# Patient Record
Sex: MALE | Race: WHITE | Employment: STUDENT | URBAN - NONMETROPOLITAN AREA
[De-identification: names, ages, dates, MRNs, and addresses within clinical notes are randomized per-mention and may not be internally consistent; named-entity substitution may affect disease eponyms.]

---
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
## 2025-01-07 ENCOUNTER — HOSPITAL ENCOUNTER (INPATIENT)
Age: 25
LOS: 10 days | Discharge: HOME | End: 2025-01-17
Attending: PHYSICAL MEDICINE & REHABILITATION | Admitting: PHYSICAL MEDICINE & REHABILITATION
Payer: COMMERCIAL

## 2025-01-07 DIAGNOSIS — S72.301A CLOSED FRACTURE OF SHAFT OF RIGHT FEMUR, INITIAL ENCOUNTER (HCC): Primary | ICD-10-CM

## 2025-01-07 DIAGNOSIS — S82.002A CLOSED NONDISPLACED FRACTURE OF LEFT PATELLA, UNSPECIFIED FRACTURE MORPHOLOGY, INITIAL ENCOUNTER: ICD-10-CM

## 2025-01-07 DIAGNOSIS — S52.571A: ICD-10-CM

## 2025-01-07 PROBLEM — S54.12XA: Status: ACTIVE | Noted: 2025-01-07

## 2025-01-07 PROBLEM — T07.XXXA MULTIPLE FRACTURE: Status: ACTIVE | Noted: 2025-01-07

## 2025-01-07 PROBLEM — M24.532: Status: ACTIVE | Noted: 2025-01-07

## 2025-01-07 PROBLEM — S54.02XA INJURY OF LEFT ULNAR NERVE: Status: ACTIVE | Noted: 2025-01-07

## 2025-01-07 PROCEDURE — 99222 1ST HOSP IP/OBS MODERATE 55: CPT | Performed by: PHYSICAL MEDICINE & REHABILITATION

## 2025-01-07 PROCEDURE — 87641 MR-STAPH DNA AMP PROBE: CPT

## 2025-01-07 PROCEDURE — 1180000000 HC REHAB R&B

## 2025-01-07 PROCEDURE — 6370000000 HC RX 637 (ALT 250 FOR IP): Performed by: PHYSICAL MEDICINE & REHABILITATION

## 2025-01-07 RX ORDER — ACETAMINOPHEN 325 MG/1
650 TABLET ORAL EVERY 6 HOURS
Status: DISCONTINUED | OUTPATIENT
Start: 2025-01-07 | End: 2025-01-17 | Stop reason: HOSPADM

## 2025-01-07 RX ORDER — ONDANSETRON 4 MG/1
4 TABLET, ORALLY DISINTEGRATING ORAL EVERY 8 HOURS PRN
Status: DISCONTINUED | OUTPATIENT
Start: 2025-01-07 | End: 2025-01-17 | Stop reason: HOSPADM

## 2025-01-07 RX ORDER — POTASSIUM CHLORIDE 1500 MG/1
40 TABLET, EXTENDED RELEASE ORAL PRN
Status: DISCONTINUED | OUTPATIENT
Start: 2025-01-07 | End: 2025-01-17 | Stop reason: HOSPADM

## 2025-01-07 RX ORDER — LOPERAMIDE HYDROCHLORIDE 2 MG/1
2 CAPSULE ORAL 4 TIMES DAILY PRN
Status: DISCONTINUED | OUTPATIENT
Start: 2025-01-07 | End: 2025-01-17 | Stop reason: HOSPADM

## 2025-01-07 RX ORDER — ACETAMINOPHEN 325 MG/1
650 TABLET ORAL EVERY 4 HOURS PRN
Status: DISCONTINUED | OUTPATIENT
Start: 2025-01-07 | End: 2025-01-17 | Stop reason: HOSPADM

## 2025-01-07 RX ORDER — POTASSIUM CHLORIDE 7.45 MG/ML
10 INJECTION INTRAVENOUS PRN
Status: DISCONTINUED | OUTPATIENT
Start: 2025-01-07 | End: 2025-01-17 | Stop reason: HOSPADM

## 2025-01-07 RX ORDER — ENOXAPARIN SODIUM 100 MG/ML
40 INJECTION SUBCUTANEOUS EVERY 12 HOURS
Status: DISCONTINUED | OUTPATIENT
Start: 2025-01-07 | End: 2025-01-07

## 2025-01-07 RX ORDER — MAGNESIUM SULFATE IN WATER 40 MG/ML
2000 INJECTION, SOLUTION INTRAVENOUS PRN
Status: DISCONTINUED | OUTPATIENT
Start: 2025-01-07 | End: 2025-01-17 | Stop reason: HOSPADM

## 2025-01-07 RX ORDER — OXYCODONE HYDROCHLORIDE 5 MG/1
5 TABLET ORAL EVERY 4 HOURS PRN
Status: DISCONTINUED | OUTPATIENT
Start: 2025-01-07 | End: 2025-01-08

## 2025-01-07 RX ORDER — GABAPENTIN 300 MG/1
300 CAPSULE ORAL EVERY 8 HOURS
Status: DISCONTINUED | OUTPATIENT
Start: 2025-01-07 | End: 2025-01-17 | Stop reason: HOSPADM

## 2025-01-07 RX ORDER — TRAZODONE HYDROCHLORIDE 50 MG/1
50 TABLET, FILM COATED ORAL NIGHTLY PRN
Status: DISCONTINUED | OUTPATIENT
Start: 2025-01-07 | End: 2025-01-17 | Stop reason: HOSPADM

## 2025-01-07 RX ORDER — NICOTINE 21 MG/24HR
1 PATCH, TRANSDERMAL 24 HOURS TRANSDERMAL DAILY
Status: DISCONTINUED | OUTPATIENT
Start: 2025-01-07 | End: 2025-01-17 | Stop reason: HOSPADM

## 2025-01-07 RX ORDER — POLYETHYLENE GLYCOL 3350 17 G/17G
17 POWDER, FOR SOLUTION ORAL DAILY PRN
Status: DISCONTINUED | OUTPATIENT
Start: 2025-01-07 | End: 2025-01-17 | Stop reason: HOSPADM

## 2025-01-07 RX ORDER — OXYCODONE HYDROCHLORIDE 5 MG/1
10 TABLET ORAL EVERY 4 HOURS PRN
Status: DISCONTINUED | OUTPATIENT
Start: 2025-01-07 | End: 2025-01-08

## 2025-01-07 RX ORDER — BISACODYL 10 MG
10 SUPPOSITORY, RECTAL RECTAL DAILY PRN
Status: DISCONTINUED | OUTPATIENT
Start: 2025-01-07 | End: 2025-01-17 | Stop reason: HOSPADM

## 2025-01-07 RX ORDER — GINSENG 100 MG
1 CAPSULE ORAL 2 TIMES DAILY
Status: DISCONTINUED | OUTPATIENT
Start: 2025-01-07 | End: 2025-01-17 | Stop reason: HOSPADM

## 2025-01-07 RX ORDER — AMLODIPINE BESYLATE 5 MG/1
5 TABLET ORAL DAILY
Status: DISCONTINUED | OUTPATIENT
Start: 2025-01-07 | End: 2025-01-08

## 2025-01-07 RX ORDER — METHOCARBAMOL 500 MG/1
500 TABLET, FILM COATED ORAL 3 TIMES DAILY
Status: DISCONTINUED | OUTPATIENT
Start: 2025-01-07 | End: 2025-01-17 | Stop reason: HOSPADM

## 2025-01-07 RX ORDER — ENOXAPARIN SODIUM 100 MG/ML
40 INJECTION SUBCUTANEOUS EVERY 24 HOURS
Status: DISCONTINUED | OUTPATIENT
Start: 2025-01-08 | End: 2025-01-17 | Stop reason: HOSPADM

## 2025-01-07 RX ORDER — DOCUSATE SODIUM 100 MG/1
100 CAPSULE, LIQUID FILLED ORAL 2 TIMES DAILY
Status: DISCONTINUED | OUTPATIENT
Start: 2025-01-07 | End: 2025-01-17 | Stop reason: HOSPADM

## 2025-01-07 RX ORDER — SENNOSIDES A AND B 8.6 MG/1
2 TABLET, FILM COATED ORAL NIGHTLY
Status: DISCONTINUED | OUTPATIENT
Start: 2025-01-07 | End: 2025-01-17 | Stop reason: HOSPADM

## 2025-01-07 RX ADMIN — GABAPENTIN 300 MG: 300 CAPSULE ORAL at 14:51

## 2025-01-07 RX ADMIN — ACETAMINOPHEN 650 MG: 325 TABLET ORAL at 20:40

## 2025-01-07 RX ADMIN — GABAPENTIN 300 MG: 300 CAPSULE ORAL at 22:53

## 2025-01-07 RX ADMIN — METHOCARBAMOL 500 MG: 500 TABLET ORAL at 14:51

## 2025-01-07 RX ADMIN — BACITRACIN 1 APPLICATION: 500 OINTMENT TOPICAL at 14:52

## 2025-01-07 RX ADMIN — ACETAMINOPHEN 650 MG: 325 TABLET ORAL at 14:51

## 2025-01-07 RX ADMIN — Medication 6 MG: at 20:40

## 2025-01-07 RX ADMIN — METHOCARBAMOL 500 MG: 500 TABLET ORAL at 20:40

## 2025-01-07 NOTE — PROGRESS NOTES
Admitted to the Inpatient Rehabilitation Unit via wheelchair.  Patient was then oriented to room and unit.  Education provided on the rehabilitation routine: three hours of therapy five days per week.      Explained patients right to have family, representative or physician notified of their admission.  Patient has Declined for physician to be notified.  Patient has Declined for family/representative to be notified.    Admitting medication orders compared with acute stay medications; home medication list reviewed with patient/family.  Medication issues identified No    Vaccination Status  Have you ever received a COVID-19 vaccine? No      Transportation:   Has transportation kept you from medical appointments, meetings, work, or from getting things needed for daily living? (Check all that apply)  No.      Health Literacy:   How often do you need to have someone help you when you read instructions, pamphlets, or other written material from your doctor or pharmacy?  0. - Never    Social Isolation:  How often do you feel lonely or isolated from those around you?  0. Never    Patient Mood Interview (PHQ-2 to 9) (from [employer]©)   Say to Patient: \"Over the last 2 weeks, have you been bothered by any of the following problems?\"   If symptom is present, enter yes in column 1 (Symptom Presence)  If yes in column 1, then ask the patient: “About how often have you been bothered by this?” Indicate response in column 2, Symptom Frequency.   Symptom Presence  No    Yes   9.    No response  Symptom Frequency  Never or 1 day  2-6 days (several days)  7-11 days (half or more of the days)  12-14 days (nearly every day)    Symptom Presence Symptom Frequency   Little interest or pleasure in doing things 0. No 0. Never or 1 day   Feeling down, depressed, or hopeless 0. No 0. Never or 1 day   If either A or B above has symptom frequency coded 2 or 3, CONTINUE asking questions below.      If not, END the interview  and right click on

## 2025-01-07 NOTE — PROGRESS NOTES
Orthopedic office MARCUS Ayala returned call noting that staples can be removed on 1/12/25 with sutures. Virtual appointment will be scheduled 1/13/25 at 3 pm.

## 2025-01-07 NOTE — CARE COORDINATION
Contacted [facility] Orthopedic Trauma office at [phone]. Surgeon's PA to call back regarding staple removal date. Sutures to be removed from right pointer finger on 1/12/25.

## 2025-01-07 NOTE — PLAN OF CARE
Problem: Discharge Planning  Goal: Discharge to home or other facility with appropriate resources  Outcome: Progressing  Flowsheets (Taken 1/7/2025 1428)  Discharge to home or other facility with appropriate resources: Identify barriers to discharge with patient and caregiver     Problem: Self Harm/Suicidality  Goal: Will have no self-injury during hospital stay  Description: INTERVENTIONS:  1.  Ensure constant observer at bedside with Q15M safety checks  2.  Maintain a safe environment  3.  Secure patient belongings  4.  Ensure family/visitors adhere to safety recommendations  5.  Ensure safety tray has been added to patient's diet order  6.  Every shift and PRN: Re-assess suicidal risk via Frequent Screener    Outcome: Progressing  Flowsheets (Taken 1/7/2025 1428)  Will have no self-injury during hospital stay: Maintain a safe environment     Problem: Safety - Adult  Goal: Free from fall injury  Outcome: Progressing  Flowsheets (Taken 1/7/2025 1428)  Free From Fall Injury: Instruct family/caregiver on patient safety     Problem: Pain  Goal: Verbalizes/displays adequate comfort level or baseline comfort level  Outcome: Progressing  Flowsheets (Taken 1/7/2025 1428)  Verbalizes/displays adequate comfort level or baseline comfort level: Encourage patient to monitor pain and request assistance     Problem: Skin/Tissue Integrity  Goal: Absence of new skin breakdown  Description: 1.  Monitor for areas of redness and/or skin breakdown  2.  Assess vascular access sites hourly  3.  Every 4-6 hours minimum:  Change oxygen saturation probe site  4.  Every 4-6 hours:  If on nasal continuous positive airway pressure, respiratory therapy assess nares and determine need for appliance change or resting period.  Outcome: Progressing
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
## 2025-01-08 ENCOUNTER — APPOINTMENT (OUTPATIENT)
Dept: GENERAL RADIOLOGY | Age: 25
End: 2025-01-08
Attending: PHYSICAL MEDICINE & REHABILITATION
Payer: COMMERCIAL

## 2025-01-08 LAB
ALBUMIN SERPL BCG-MCNC: 3.7 G/DL (ref 3.5–5.1)
ALP SERPL-CCNC: 92 U/L (ref 38–126)
ALT SERPL W/O P-5'-P-CCNC: 19 U/L (ref 11–66)
ANION GAP SERPL CALC-SCNC: 11 MEQ/L (ref 8–16)
AST SERPL-CCNC: 14 U/L (ref 5–40)
BASOPHILS ABSOLUTE: 0 THOU/MM3 (ref 0–0.1)
BASOPHILS NFR BLD AUTO: 0.3 %
BILIRUB SERPL-MCNC: 0.4 MG/DL (ref 0.3–1.2)
BUN SERPL-MCNC: 12 MG/DL (ref 7–22)
CALCIUM SERPL-MCNC: 9.6 MG/DL (ref 8.5–10.5)
CHLORIDE SERPL-SCNC: 102 MEQ/L (ref 98–111)
CHOLEST SERPL-MCNC: 160 MG/DL (ref 100–199)
CO2 SERPL-SCNC: 26 MEQ/L (ref 23–33)
CREAT SERPL-MCNC: 0.7 MG/DL (ref 0.4–1.2)
DEPRECATED RDW RBC AUTO: 40.1 FL (ref 35–45)
EOSINOPHIL NFR BLD AUTO: 1.3 %
EOSINOPHILS ABSOLUTE: 0.1 THOU/MM3 (ref 0–0.4)
ERYTHROCYTE [DISTWIDTH] IN BLOOD BY AUTOMATED COUNT: 13 % (ref 11.5–14.5)
GFR SERPL CREATININE-BSD FRML MDRD: > 90 ML/MIN/1.73M2
GLUCOSE SERPL-MCNC: 93 MG/DL (ref 70–108)
HCT VFR BLD AUTO: 33.3 % (ref 42–52)
HDLC SERPL-MCNC: 38 MG/DL
HGB BLD-MCNC: 10.5 GM/DL (ref 14–18)
IMM GRANULOCYTES # BLD AUTO: 0.05 THOU/MM3 (ref 0–0.07)
IMM GRANULOCYTES NFR BLD AUTO: 0.6 %
LDLC SERPL CALC-MCNC: 107 MG/DL
LYMPHOCYTES ABSOLUTE: 1.1 THOU/MM3 (ref 1–4.8)
LYMPHOCYTES NFR BLD AUTO: 13.5 %
MCH RBC QN AUTO: 26.9 PG (ref 26–33)
MCHC RBC AUTO-ENTMCNC: 31.5 GM/DL (ref 32.2–35.5)
MCV RBC AUTO: 85.2 FL (ref 80–94)
MONOCYTES ABSOLUTE: 0.8 THOU/MM3 (ref 0.4–1.3)
MONOCYTES NFR BLD AUTO: 10.3 %
MRSA DNA SPEC QL NAA+PROBE: NEGATIVE
NEUTROPHILS ABSOLUTE: 5.8 THOU/MM3 (ref 1.8–7.7)
NEUTROPHILS NFR BLD AUTO: 74 %
NRBC BLD AUTO-RTO: 0 /100 WBC
PLATELET # BLD AUTO: 311 THOU/MM3 (ref 130–400)
PMV BLD AUTO: 10.6 FL (ref 9.4–12.4)
POTASSIUM SERPL-SCNC: 5.2 MEQ/L (ref 3.5–5.2)
PREALB SERPL-MCNC: 21.2 MG/DL (ref 20–40)
PROT SERPL-MCNC: 6.8 G/DL (ref 6.1–8)
RBC # BLD AUTO: 3.91 MILL/MM3 (ref 4.7–6.1)
SODIUM SERPL-SCNC: 139 MEQ/L (ref 135–145)
TRIGL SERPL-MCNC: 76 MG/DL (ref 0–199)
WBC # BLD AUTO: 7.9 THOU/MM3 (ref 4.8–10.8)

## 2025-01-08 PROCEDURE — 97167 OT EVAL HIGH COMPLEX 60 MIN: CPT

## 2025-01-08 PROCEDURE — 80053 COMPREHEN METABOLIC PANEL: CPT

## 2025-01-08 PROCEDURE — 97530 THERAPEUTIC ACTIVITIES: CPT

## 2025-01-08 PROCEDURE — 73100 X-RAY EXAM OF WRIST: CPT

## 2025-01-08 PROCEDURE — 6370000000 HC RX 637 (ALT 250 FOR IP): Performed by: FAMILY MEDICINE

## 2025-01-08 PROCEDURE — 84134 ASSAY OF PREALBUMIN: CPT

## 2025-01-08 PROCEDURE — 92523 SPEECH SOUND LANG COMPREHEN: CPT

## 2025-01-08 PROCEDURE — 73560 X-RAY EXAM OF KNEE 1 OR 2: CPT

## 2025-01-08 PROCEDURE — 99232 SBSQ HOSP IP/OBS MODERATE 35: CPT | Performed by: PHYSICAL MEDICINE & REHABILITATION

## 2025-01-08 PROCEDURE — 6370000000 HC RX 637 (ALT 250 FOR IP): Performed by: PHYSICAL MEDICINE & REHABILITATION

## 2025-01-08 PROCEDURE — 73552 X-RAY EXAM OF FEMUR 2/>: CPT

## 2025-01-08 PROCEDURE — 1180000000 HC REHAB R&B

## 2025-01-08 PROCEDURE — 85025 COMPLETE CBC W/AUTO DIFF WBC: CPT

## 2025-01-08 PROCEDURE — 90791 PSYCH DIAGNOSTIC EVALUATION: CPT | Performed by: PSYCHOLOGIST

## 2025-01-08 PROCEDURE — 73120 X-RAY EXAM OF HAND: CPT

## 2025-01-08 PROCEDURE — 36415 COLL VENOUS BLD VENIPUNCTURE: CPT

## 2025-01-08 PROCEDURE — 6360000002 HC RX W HCPCS: Performed by: PHYSICAL MEDICINE & REHABILITATION

## 2025-01-08 PROCEDURE — 80061 LIPID PANEL: CPT

## 2025-01-08 PROCEDURE — 97535 SELF CARE MNGMENT TRAINING: CPT

## 2025-01-08 PROCEDURE — 72170 X-RAY EXAM OF PELVIS: CPT

## 2025-01-08 RX ORDER — OXYCODONE HYDROCHLORIDE 5 MG/1
2.5 TABLET ORAL EVERY 4 HOURS PRN
Status: DISCONTINUED | OUTPATIENT
Start: 2025-01-08 | End: 2025-01-17 | Stop reason: HOSPADM

## 2025-01-08 RX ORDER — MULTIVITAMIN WITH IRON
1 TABLET ORAL DAILY
Status: DISCONTINUED | OUTPATIENT
Start: 2025-01-08 | End: 2025-01-17 | Stop reason: HOSPADM

## 2025-01-08 RX ORDER — OXYCODONE HYDROCHLORIDE 5 MG/1
5 TABLET ORAL EVERY 4 HOURS PRN
Status: DISCONTINUED | OUTPATIENT
Start: 2025-01-08 | End: 2025-01-17 | Stop reason: HOSPADM

## 2025-01-08 RX ORDER — CALCIUM CARBONATE 500(1250)
500 TABLET ORAL 2 TIMES DAILY WITH MEALS
Status: DISCONTINUED | OUTPATIENT
Start: 2025-01-08 | End: 2025-01-17 | Stop reason: HOSPADM

## 2025-01-08 RX ADMIN — SERTRALINE HYDROCHLORIDE 50 MG: 50 TABLET ORAL at 09:02

## 2025-01-08 RX ADMIN — SENNOSIDES 17.2 MG: 8.6 TABLET, FILM COATED ORAL at 20:01

## 2025-01-08 RX ADMIN — DOCUSATE SODIUM 100 MG: 100 CAPSULE, LIQUID FILLED ORAL at 09:01

## 2025-01-08 RX ADMIN — AMLODIPINE BESYLATE 5 MG: 5 TABLET ORAL at 09:03

## 2025-01-08 RX ADMIN — CALCIUM 500 MG: 500 TABLET ORAL at 14:35

## 2025-01-08 RX ADMIN — METHOCARBAMOL 500 MG: 500 TABLET ORAL at 20:01

## 2025-01-08 RX ADMIN — METHOCARBAMOL 500 MG: 500 TABLET ORAL at 14:35

## 2025-01-08 RX ADMIN — BACITRACIN 1 APPLICATION: 500 OINTMENT TOPICAL at 20:02

## 2025-01-08 RX ADMIN — GABAPENTIN 300 MG: 300 CAPSULE ORAL at 06:04

## 2025-01-08 RX ADMIN — DOCUSATE SODIUM 100 MG: 100 CAPSULE, LIQUID FILLED ORAL at 20:01

## 2025-01-08 RX ADMIN — BACITRACIN 1 APPLICATION: 500 OINTMENT TOPICAL at 09:00

## 2025-01-08 RX ADMIN — Medication 1 TABLET: at 14:35

## 2025-01-08 RX ADMIN — ACETAMINOPHEN 650 MG: 325 TABLET ORAL at 20:02

## 2025-01-08 RX ADMIN — ACETAMINOPHEN 650 MG: 325 TABLET ORAL at 15:47

## 2025-01-08 RX ADMIN — ACETAMINOPHEN 650 MG: 325 TABLET ORAL at 09:01

## 2025-01-08 RX ADMIN — Medication 12.5 MG: at 06:04

## 2025-01-08 RX ADMIN — CALCIUM 500 MG: 500 TABLET ORAL at 18:01

## 2025-01-08 RX ADMIN — METHOCARBAMOL 500 MG: 500 TABLET ORAL at 09:02

## 2025-01-08 RX ADMIN — Medication 6 MG: at 20:01

## 2025-01-08 RX ADMIN — GABAPENTIN 300 MG: 300 CAPSULE ORAL at 14:35

## 2025-01-08 RX ADMIN — GABAPENTIN 300 MG: 300 CAPSULE ORAL at 20:01

## 2025-01-08 RX ADMIN — ENOXAPARIN SODIUM 40 MG: 100 INJECTION SUBCUTANEOUS at 09:00

## 2025-01-08 ASSESSMENT — PAIN DESCRIPTION - DESCRIPTORS: DESCRIPTORS: DISCOMFORT

## 2025-01-08 ASSESSMENT — PAIN DESCRIPTION - LOCATION: LOCATION: GENERALIZED

## 2025-01-08 ASSESSMENT — PAIN SCALES - GENERAL: PAINLEVEL_OUTOF10: 2
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
## 2025-01-08 NOTE — PROGRESS NOTES
New brace added on left hand /wrist area.Skin inspection completed and past cut marks are healed ,that aea and finger were dry,lotion applied.Will monitor skin.

## 2025-01-08 NOTE — PLAN OF CARE
Problem: Discharge Planning  Goal: Discharge to home or other facility with appropriate resources  1/7/2025 2316 by Yadira Avitia RN  Flowsheets (Taken 1/7/2025 2316)  Discharge to home or other facility with appropriate resources: Identify barriers to discharge with patient and caregiver     Problem: Safety - Adult  Goal: Free from fall injury  1/7/2025 2316 by Yadira Avitia RN  Flowsheets (Taken 1/7/2025 2316)  Free From Fall Injury: Instruct family/caregiver on patient safety  Note: Patient has remained free of physical injury during this shift. Safe environment provided, call light within reach, and hourly rounding completed.      Problem: Pain  Goal: Verbalizes/displays adequate comfort level or baseline comfort level  1/7/2025 2316 by Yadira Avitia RN  Flowsheets (Taken 1/7/2025 2316)  Verbalizes/displays adequate comfort level or baseline comfort level: Encourage patient to monitor pain and request assistance  1/7/2025 1428 by Evette Ladd, RN  Outcome: Progressing  Flowsheets (Taken 1/7/2025 1428)  Verbalizes/displays adequate comfort level or baseline comfort level: Encourage patient to monitor pain and request assistance   Care plan reviewed with patient.  Patient  verbalize understanding of the plan of care and contribute to goal setting.
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
## 2025-01-08 NOTE — PLAN OF CARE
Problem: Self Harm/Suicidality  Goal: Will have no self-injury during hospital stay  Description: INTERVENTIONS:  1.  Ensure constant observer at bedside with Q15M safety checks  2.  Maintain a safe environment  3.  Secure patient belongings  4.  Ensure family/visitors adhere to safety recommendations  5.  Ensure safety tray has been added to patient's diet order  6.  Every shift and PRN: Re-assess suicidal risk via Frequent Screener    Outcome: Progressing  Flowsheets (Taken 1/8/2025 1154)  Will have no self-injury during hospital stay: Maintain a safe environment     Problem: Safety - Adult  Goal: Free from fall injury  1/8/2025 1154 by Liseth Owen LPN  Outcome: Progressing     Problem: Pain  Goal: Verbalizes/displays adequate comfort level or baseline comfort level  1/8/2025 1154 by Liseth Owen LPN  Outcome: Progressing     Problem: Skin/Tissue Integrity  Goal: Absence of new skin breakdown  Description: 1.  Monitor for areas of redness and/or skin breakdown  2.  Assess vascular access sites hourly  3.  Every 4-6 hours minimum:  Change oxygen saturation probe site  4.  Every 4-6 hours:  If on nasal continuous positive airway pressure, respiratory therapy assess nares and determine need for appliance change or resting period.  Outcome: Progressing

## 2025-01-08 NOTE — CARE COORDINATION
Patient rested well this shift, patient sleeps in chair. No c/o pain or discomfort this shift, chair alarm on, call light in reach.

## 2025-01-08 NOTE — PLAN OF CARE
Problem: Discharge Planning  Goal: Discharge to home or other facility with appropriate resources  2025 1156 by Uzma Lazaro LISW-S  Note:   [facility]  Physical Medicine Case Management Assessment    [x] Inpatient Rehabilitation Unit    Patient Name: Derick Sanchez III        MRN: [id number]    : 2000  (24 y.o.)  Gender: male   Date of Admission: 2025 12:28 PM    Family/Social/Home Environment:   Prior to admission, patient was living with his sister. Patient reports planning to return to his sister's home at discharge. Patient reports being independent at home. Patient was completing his ADLs, housekeeping, meal prep, errands, finances and driving. Patient works full-time at [employer]. Patient's support includes his sister, who works full-time. Patient's father, Derick Lantigua, is supportive, but works. Patient reports that his dad is off work at this time. Patient reports that his dad and step mom are at his sister's house daily to watch her children. Patient reports that his step mom will be able to provide assistance. Patient's family physician is Richard Singer MD. Patient prefers [facility]. Patient is motivated to participate in therapy.    Social/Functional History  Lives With: Family (Sister)  Type of Home: House  Home Layout: Two level, Performs ADL's on one level  Home Access: Level entry (1 step into laundry room and one step into kitchen)  Bathroom Shower/Tub: Tub/Shower unit, Curtain  Bathroom Toilet: Standard  Bathroom Accessibility: Accessible, Walker accessible  Home Equipment: None  Has the patient had two or more falls in the past year or any fall with injury in the past year?: No  Prior Level of Assist for ADLs: Independent  Prior Level of Assist for Homemaking: Independent  Prior Level of Assist for Ambulation: Independent household ambulator, with or without device  Prior Level of Assist for Transfers: Independent  Active : Yes  Mode of
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
## 2025-01-08 NOTE — PROGRESS NOTES
Pt pleasant today,call light In reach,When asked he didn't feel any thought of self harm and denied being upset at this time.He said he has a good support system currently.Initiated his incentive spirometer ,he used it well and understands use and it being every 2hrs.Will monitor.
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
## 2025-01-08 NOTE — PROGRESS NOTES
Coordination note    Per verbal communication with Berna from ortho, cast on LUE to be removed by ortho and given the okay to don general splint to LUE until custom splint can be fabricated.  Per ortho pt to have volar slab to MCP, with MCP free in neutral or hand safe position.
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
## 2025-01-08 NOTE — PLAN OF CARE
Individualized Plan of Care  [facility] Inpatient Rehabilitation Unit    Rehabilitation physician: Dr. Tolliver    Admit Date: 1/7/2025     Rehabilitation Diagnosis: Fractures [T07.XXXA]  Multiple fracture [T07.XXXA]      Rehabilitation impairments: self care, mobility, motor dysfunction, bowel/bladder management, pain management, safety, and cognitive function    Factors facilitating achievement of predicted outcomes: Family support, Motivated, Cooperative, and Pleasant  Barriers to the achievement of predicted outcomes: Pain, Limited family support, Cognitive deficit, Limited safety awareness, Limited insight into deficits, Unrealistic expectations, Decreased endurance, Decreased sensation, Decreased proprioception, Upper extremity weakness, Lower extremity weakness, Stairs at home, Skin Care, and Wound Care    Patient Goals: Improve independence with mobility, Improvement of mobility at a wheelchair level, Increase overall strength and endurance, Increase balance, Increase endurance, Increase independence with activities of daily living, Improve cognition, Increase self-awareness, Increase safety awareness, Increase community integration, Increase socialization, Functional communication with caregivers, Integrate appropriate pain management plan, Assure adequate nutritional option for discharge, Continence of bowel and bladder, and Provide appropriate patient and family education      NURSING:  Nursing goals for Derick Sanchez III while on the rehabilitation unit will include:  Continence of bowel and bladder, Adequate number of bowel movements, Urinate with no urinary retention >300ml in bladder, Maintain O2 SATs at an acceptable level during stay, Effective pain management while on the rehabilitation unit, Establish adequate pain control plan for discharge, Absence of skin breakdown while on the rehabilitation unit, Improved skin integrity via assessments including wound measurements, Avoidance of

## 2025-01-08 NOTE — PROGRESS NOTES
chart review      Patients Prior Functional  Level:  Independent      Current functional status for upper extremity ADL’s: Minimal assistance      Current functional status for lower extremity ADL’s: Maximum assistance footwear assistance.      Current functional status for bed, chair, wheelchair transfers: Minimal assistance      Current functional status for toilet transfers: Minimal assistance      Current functional status for locomotion: Minimal assistance platform walker.      Current functional status for bladder management: Minimal contact assistance     Current functional status for bowel management: Moderate assistance     Current functional status for comprehension: Complete independence     Current functional status for expression: Complete independence     Current functional status for social interaction: Complete independence     Current functional status for problem solving: Complete independence     Current functional status for memory: Complete independence     Expected level of Improvement in Self-Care:  Modified independence     Expected level of Improvement in Sphincter Control:  Modified independence     Expected level of Improvement in Transfers: Modified independence     Expected level of Improvement in Locomotion:  Modified independence     Expected level of Improvement in Communication and Social Cognition: Modified independence     Expected length of time to achieve that level of improvement: 2 weeks     Current rehab issues: ADL dysfunction, bladder management,bowel management, carry over of therapy techniques, discharge planning, disease and co-morbidity management, gait/mobility dysfunction, medication management, nutrition and hydration management, Ongoing assessment of safety, Pain management, Patient and family education, Prevention of secondary complications, Skin Integrity, NWB.     Required therapy: Physical Therapy, Occupational Therapy 3 hours per day, 5-6 days per week.   Recreational Therapy 1 hour per week.     Expected Discharge Destination: Home     Expected Post Discharge Treatments: Out Patient     Other information relevant to the care needs:    Patient and family education for discharge to home.      Acute Inpatient Rehabilitation Disclosure Statement provided to patient.  Patient verbalized understanding.      Patient requires an intensive and coordinate interdisciplinary team approach to the delivery of rehabilitation care including PT/OT/ST and 24 hour nursing care and physician supervision to safely return to home setting with family.       I have reviewed and concur with the findings and results of the pre-admission screening assessment completed by the Inpatient Rehabilitation Admissions Coordinator.     FINN TOLLIVER MD                 Revision History    Date/Time User Provider Type Action   1/7/2025  8:26 AM Finn Tolliver MD Physician Sign   1/7/2025  8:22 AM Eric Tang RN Registered Nurse Sign    View Details Report

## 2025-01-08 NOTE — PROGRESS NOTES
[facility]  Individualized Disclosure Statement      Patient: Derick Sanchez III      Scope of Service  [facility] provides 24 hour individualized service to patients with functional limitations due to, but not limited to: stroke, brain injury, spinal cord injury, major multiple trauma, fractures, amputation, and neurological disorders. The [facility] provides rehabilitative nursing and medical services as well as physical, occupational, speech, and recreation therapies.  [facility] is fully accredited by the Commission on Accreditation of Rehabilitation Facilities (CARF) as a comprehensive provider of rehabilitation services.  Patients admitted to the [facility] receive a minimum of three hours of therapy per day, at least five days per week, with a revised therapy schedule on weekends and holidays.  Physical therapy, occupational therapy, and speech therapy are provided seven days per week including holidays.  Other therapeutic services are available on weekends and evenings as needed or scheduled.  Intensity of Treatment  Your treatment program will consist of Nursing Care and:  1.5 hours of Physical Therapy, per day  1.5 hours of Occupational Therapy, per day   30-60 minutes of Speech Therapy, per day  1 hour of Recreational Therapy, per week  Depending on your needs, the exact time spent with each of the above disciplines may fluctuate, however you will receive at least 3 hours of therapy at least 5 days per week.    [facility] maintains contracts with most insurance plans.  Depending on the type of coverage, the insurance may impose limits on the coverage for rehabilitation care.  Coverage is based on the premise that you are able to fully participate in the rehabilitation program and show continued progress. Please verify your own insurance information.  A copy of this was given to the patient/ family on this date.  Insurance Coverage  Your insurance company has made the following determination relative to the length of your stay:   Your estimated length of stay is 14 days   Your insurance Coverage has been verified as follows:    Primary Insurance: Payor: [facility] /  /  /    Deductible: $1676.00       Coverage: Active  Secondary Insurance:  Secondary insurance policies often cover co-pay amounts, but to ensure payment please contact your insurance company.    Alternative Resources: Please ask the  for more information [phone]

## 2025-01-09 LAB — 25(OH)D3 SERPL-MCNC: 10 NG/ML (ref 30–100)

## 2025-01-09 PROCEDURE — 97535 SELF CARE MNGMENT TRAINING: CPT

## 2025-01-09 PROCEDURE — 82306 VITAMIN D 25 HYDROXY: CPT

## 2025-01-09 PROCEDURE — L3906 WHO W/O JOINTS CF: HCPCS

## 2025-01-09 PROCEDURE — 97110 THERAPEUTIC EXERCISES: CPT

## 2025-01-09 PROCEDURE — 6360000002 HC RX W HCPCS: Performed by: PHYSICAL MEDICINE & REHABILITATION

## 2025-01-09 PROCEDURE — 6370000000 HC RX 637 (ALT 250 FOR IP): Performed by: PHYSICAL MEDICINE & REHABILITATION

## 2025-01-09 PROCEDURE — 97112 NEUROMUSCULAR REEDUCATION: CPT

## 2025-01-09 PROCEDURE — 97530 THERAPEUTIC ACTIVITIES: CPT

## 2025-01-09 PROCEDURE — 1180000000 HC REHAB R&B

## 2025-01-09 PROCEDURE — 6370000000 HC RX 637 (ALT 250 FOR IP): Performed by: FAMILY MEDICINE

## 2025-01-09 PROCEDURE — 36415 COLL VENOUS BLD VENIPUNCTURE: CPT

## 2025-01-09 PROCEDURE — 99232 SBSQ HOSP IP/OBS MODERATE 35: CPT | Performed by: PHYSICAL MEDICINE & REHABILITATION

## 2025-01-09 RX ORDER — VITAMIN B COMPLEX
5000 TABLET ORAL DAILY
Status: DISCONTINUED | OUTPATIENT
Start: 2025-01-09 | End: 2025-01-17 | Stop reason: HOSPADM

## 2025-01-09 RX ADMIN — ENOXAPARIN SODIUM 40 MG: 100 INJECTION SUBCUTANEOUS at 09:20

## 2025-01-09 RX ADMIN — GABAPENTIN 300 MG: 300 CAPSULE ORAL at 20:18

## 2025-01-09 RX ADMIN — METHOCARBAMOL 500 MG: 500 TABLET ORAL at 09:21

## 2025-01-09 RX ADMIN — DOCUSATE SODIUM 100 MG: 100 CAPSULE, LIQUID FILLED ORAL at 20:17

## 2025-01-09 RX ADMIN — ACETAMINOPHEN 650 MG: 325 TABLET ORAL at 09:19

## 2025-01-09 RX ADMIN — SENNOSIDES 17.2 MG: 8.6 TABLET, FILM COATED ORAL at 20:17

## 2025-01-09 RX ADMIN — GABAPENTIN 300 MG: 300 CAPSULE ORAL at 05:22

## 2025-01-09 RX ADMIN — METHOCARBAMOL 500 MG: 500 TABLET ORAL at 13:14

## 2025-01-09 RX ADMIN — Medication 5000 UNITS: at 13:11

## 2025-01-09 RX ADMIN — GABAPENTIN 300 MG: 300 CAPSULE ORAL at 13:14

## 2025-01-09 RX ADMIN — Medication 12.5 MG: at 05:22

## 2025-01-09 RX ADMIN — ACETAMINOPHEN 650 MG: 325 TABLET ORAL at 20:17

## 2025-01-09 RX ADMIN — ACETAMINOPHEN 650 MG: 325 TABLET ORAL at 13:12

## 2025-01-09 RX ADMIN — CALCIUM 500 MG: 500 TABLET ORAL at 18:43

## 2025-01-09 RX ADMIN — Medication 1 TABLET: at 09:22

## 2025-01-09 RX ADMIN — Medication 6 MG: at 20:17

## 2025-01-09 RX ADMIN — CALCIUM 500 MG: 500 TABLET ORAL at 09:22

## 2025-01-09 RX ADMIN — SERTRALINE HYDROCHLORIDE 50 MG: 50 TABLET ORAL at 09:21

## 2025-01-09 RX ADMIN — METHOCARBAMOL 500 MG: 500 TABLET ORAL at 20:17

## 2025-01-09 ASSESSMENT — PAIN - FUNCTIONAL ASSESSMENT
PAIN_FUNCTIONAL_ASSESSMENT: ACTIVITIES ARE NOT PREVENTED
PAIN_FUNCTIONAL_ASSESSMENT: ACTIVITIES ARE NOT PREVENTED

## 2025-01-09 ASSESSMENT — PAIN DESCRIPTION - ORIENTATION
ORIENTATION: RIGHT
ORIENTATION: RIGHT

## 2025-01-09 ASSESSMENT — PAIN DESCRIPTION - LOCATION
LOCATION: LEG
LOCATION: HIP

## 2025-01-09 ASSESSMENT — PAIN DESCRIPTION - DESCRIPTORS
DESCRIPTORS: ACHING
DESCRIPTORS: PATIENT UNABLE TO DESCRIBE

## 2025-01-09 ASSESSMENT — PAIN SCALES - GENERAL
PAINLEVEL_OUTOF10: 2
PAINLEVEL_OUTOF10: 2

## 2025-01-09 NOTE — PROGRESS NOTES
multidisciplinary care conference yesterday.  Continuing intensive rehab treatment is planned.      Rehabilitation:  PT: Reviewed.    Transfers:  Sit to Stand: Minimal Assistance, Moderate Assistance, X 1  Stand to Sit:Minimal Assistance, X 1  *transfers performed from recliner during session with vc's given for proper positioning and to maintain NWB precautions; pt performed first transfer with bilateral elbows on platforms (Min Ax1) and performed 2nd transfer with left elbow on chair armrest and increased force production through right LE due to left LE being locked out in knee extension (Mod Ax1)     Ambulation:  Contact Guard Assistance  Distance: 5', 15', 5'  Surface: Level Tile  Device: Rolling Walker with Bilateral Platforms  Gait Deviations: Forward Flexed Posture, Slow Azra, Decreased Step Length Bilaterally, Decreased Gait Speed, Decreased Heel Strike Bilaterally, Decreased Foot Clearance Right, Decreased Foot Clearance Left, and Increased reliance on assistive device     Contact Guard Assistance, X 1  Distance: 150 feet   Surface: Level Tile  Device: Rolling Walker with Bilateral Platforms   Gait Deviations: Slow Azra, Decreased Gait Speed, Narrow Base of Support, and Increased reliance on assistive device      Balance:  Static Sitting Balance:  Modified Independent  Static Standing Balance: Stand By Assistance, Contact Guard Assistance, X 1  Dynamic Standing Balance: Contact Guard Assistance, Minimal Assistance     Patient stood without UE support and completed balloon taps with elbows, alternating between elbows  Patient stood on airex pad without UE support and completed balloon taps with elbows, alternating.  Pt required minimal assistance to ascend/descend airex pad.     Neuromuscular re-education:  Patient instructed in standing at platform RW and tapping blaze pods with LE's.  Verbal cues to attempt to reduce pressure through UE's to challenge balance.  Completed additional trial and placed  distribution.  He continues to have fifth left hand fingers weakness especially with finger abduction.  The right wrist hand remains in short arm cast brace.  He  tolerated the intensive rehab treatment provided yesterday well.  His function is improving slowly.      Plan:  Continues intensive PT/OT/SLP/RT inpatient rehabilitation program at least 3 hours per day, 5 days per week in order to improve functional status prior to discharge.  Family education and training will be completed.  Equipment evaluations and recommendations will be completed as appropriate.       Rehabilitation nursing continue to be involved for bowel, bladder, skin, and pain management.  Nursing will also provide education and training to patient and family.    Prophylaxis:  DVT: Lovenox, CHERYL stocking, intermittent pneumatic compression device.  GI: Colace, Senokot, Movantik, GlycoLax as needed, milk of magnesium as needed, Dulcolax suppository as needed, Zofran as needed, Enemeez enema as needed, Imodium as needed  Pain: Tylenol,, Robaxin, Tylenol as needed; reduce oxycodone to 2.5 to 5 mg as needed, Voltaren gel application as needed  Continue Norvasc for hypertension  Continue bacitracin ointment application to laceration wound  Continue Neurontin for left hand neuropathic pain  Continue NicoDerm patch for tobacco dependency  Continue Zoloft for depression  Continue melatonin, trazodone as needed for insomnia  Nutrition:  Continue regular diet  Bladder: Monitoring signs or symptoms of UTI  Bowel: Monitoring signs or symptoms of constipation   and case management for coordination of care and discharge planning      Missed Therapy Time:  None      FINN GARCIA MD     This report has been created using voice recognition software. It may contain minor errors which are inherent in voice recognition technology
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
## 2025-01-09 NOTE — PLAN OF CARE
Problem: Discharge Planning  Goal: Discharge to home or other facility with appropriate resources  1/8/2025 2318 by Fidencio Woodward, RN  Outcome: Progressing  Flowsheets (Taken 1/8/2025 1945)  Discharge to home or other facility with appropriate resources: Identify barriers to discharge with patient and caregiver     Problem: Safety - Adult  Goal: Free from fall injury  1/8/2025 2318 by Fidencio Woodward, RN  Outcome: Progressing     Problem: Pain  Goal: Verbalizes/displays adequate comfort level or baseline comfort level  1/8/2025 2318 by Fidencio Woodward, RN  Outcome: Progressing  Flowsheets (Taken 1/8/2025 1945)  Verbalizes/displays adequate comfort level or baseline comfort level: Encourage patient to monitor pain and request assistance     Problem: Skin/Tissue Integrity  Goal: Absence of new skin breakdown  Description: 1.  Monitor for areas of redness and/or skin breakdown  2.  Assess vascular access sites hourly  3.  Every 4-6 hours minimum:  Change oxygen saturation probe site  4.  Every 4-6 hours:  If on nasal continuous positive airway pressure, respiratory therapy assess nares and determine need for appliance change or resting period.  1/8/2025 2318 by Fidencio Woodward, RN  Outcome: Progressing

## 2025-01-09 NOTE — CARE COORDINATION
Patient is oriented, calm and coordinated well with NOD. Patient slept on the chair as per his request. He is continent of bladder/bowel. Goes to the bathroom with 1 assist via walker, wt bearing as tolerated on bilateral LE. No pain/ discomfort during the shift. Electronically signed by Fidencio Woodward RN on 1/9/2025 at 3:59 AM
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
## 2025-01-09 NOTE — PLAN OF CARE
Problem: Discharge Planning  Goal: Discharge to home or other facility with appropriate resources  1/9/2025 1615 by Uzma Lazaro LISW-S  Note: Patient is requesting a notary to assist with paperwork for his car. SW contact notaries at [facility], but could not get a response. Patient reported understanding and will have the paperwork in his room when they are available. SW will follow and maintain involvement in discharge planning.
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
## 2025-01-09 NOTE — PLAN OF CARE
Problem: Discharge Planning  Goal: Discharge to home or other facility with appropriate resources  1/9/2025 1106 by Uzma Lazaro LISW-S  Note: Team conference held Thursday, 1/9. Recommendations of the team were explained to the patient by Dr Tolliver and SW. Team is recommending that patient continue on acute inpatient rehab for PT and OT, with an undetermined discharge date. Following discharge, the team's recommendations are undetermined at this time. Care plan reviewed with patient. Patient verbalized understanding of the plan of care and contributed to goal setting. SW to follow and maintain involvement in discharge planning.
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
## 2025-01-10 ENCOUNTER — APPOINTMENT (OUTPATIENT)
Dept: GENERAL RADIOLOGY | Age: 25
End: 2025-01-10
Attending: PHYSICAL MEDICINE & REHABILITATION
Payer: COMMERCIAL

## 2025-01-10 PROCEDURE — 97530 THERAPEUTIC ACTIVITIES: CPT

## 2025-01-10 PROCEDURE — 73110 X-RAY EXAM OF WRIST: CPT

## 2025-01-10 PROCEDURE — 73564 X-RAY EXAM KNEE 4 OR MORE: CPT

## 2025-01-10 PROCEDURE — 1180000000 HC REHAB R&B

## 2025-01-10 PROCEDURE — 97112 NEUROMUSCULAR REEDUCATION: CPT

## 2025-01-10 PROCEDURE — 97110 THERAPEUTIC EXERCISES: CPT

## 2025-01-10 PROCEDURE — 6370000000 HC RX 637 (ALT 250 FOR IP): Performed by: PHYSICAL MEDICINE & REHABILITATION

## 2025-01-10 PROCEDURE — 97535 SELF CARE MNGMENT TRAINING: CPT

## 2025-01-10 PROCEDURE — 6360000002 HC RX W HCPCS: Performed by: PHYSICAL MEDICINE & REHABILITATION

## 2025-01-10 PROCEDURE — 99232 SBSQ HOSP IP/OBS MODERATE 35: CPT | Performed by: PHYSICAL MEDICINE & REHABILITATION

## 2025-01-10 PROCEDURE — 90832 PSYTX W PT 30 MINUTES: CPT | Performed by: PSYCHOLOGIST

## 2025-01-10 PROCEDURE — 97116 GAIT TRAINING THERAPY: CPT

## 2025-01-10 PROCEDURE — 6370000000 HC RX 637 (ALT 250 FOR IP): Performed by: FAMILY MEDICINE

## 2025-01-10 RX ADMIN — CALCIUM 500 MG: 500 TABLET ORAL at 17:57

## 2025-01-10 RX ADMIN — ENOXAPARIN SODIUM 40 MG: 100 INJECTION SUBCUTANEOUS at 07:50

## 2025-01-10 RX ADMIN — GABAPENTIN 300 MG: 300 CAPSULE ORAL at 20:00

## 2025-01-10 RX ADMIN — ACETAMINOPHEN 650 MG: 325 TABLET ORAL at 07:50

## 2025-01-10 RX ADMIN — METHOCARBAMOL 500 MG: 500 TABLET ORAL at 15:14

## 2025-01-10 RX ADMIN — GABAPENTIN 300 MG: 300 CAPSULE ORAL at 05:38

## 2025-01-10 RX ADMIN — Medication 1 TABLET: at 07:50

## 2025-01-10 RX ADMIN — ACETAMINOPHEN 650 MG: 325 TABLET ORAL at 19:51

## 2025-01-10 RX ADMIN — METHOCARBAMOL 500 MG: 500 TABLET ORAL at 07:50

## 2025-01-10 RX ADMIN — SERTRALINE HYDROCHLORIDE 50 MG: 50 TABLET ORAL at 07:50

## 2025-01-10 RX ADMIN — Medication 12.5 MG: at 05:38

## 2025-01-10 RX ADMIN — METHOCARBAMOL 500 MG: 500 TABLET ORAL at 19:51

## 2025-01-10 RX ADMIN — Medication 6 MG: at 19:51

## 2025-01-10 RX ADMIN — Medication 5000 UNITS: at 07:50

## 2025-01-10 RX ADMIN — ACETAMINOPHEN 650 MG: 325 TABLET ORAL at 15:14

## 2025-01-10 RX ADMIN — CALCIUM 500 MG: 500 TABLET ORAL at 07:50

## 2025-01-10 RX ADMIN — GABAPENTIN 300 MG: 300 CAPSULE ORAL at 15:14

## 2025-01-10 RX ADMIN — DOCUSATE SODIUM 100 MG: 100 CAPSULE, LIQUID FILLED ORAL at 07:50

## 2025-01-10 ASSESSMENT — PAIN SCALES - GENERAL: PAINLEVEL_OUTOF10: 0
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
## 2025-01-10 NOTE — PROGRESS NOTES
Patient has received phone call from [facility] orthopedic department ([phone]) requesting the patient to have following x-ray to be done for appointment on 1/13/2025:  To be of right knee  2 view of the right femur  3 view right wrist  2 view of left knee    I called hepatic to [facility] orthopedic department ([phone]) and talk to the physician.  I was informed that the 1/13/2025 visit will be video visit/telemedicine visit.  I informed the physician that I will order the remaining x-ray and have ordered x-ray images done on 1/8/2025 and 1/10/2025 to be pushed to [facility] by our radiology department.    Three-view right knee x-ray and 3 view right wrist x-ray are ordered.    I called radiology department and request or x-rays images done on 1/8/2025 and 1/10/2025 to be pushed to [facility] system.    FINN GARCIA MD

## 2025-01-10 NOTE — PROGRESS NOTES
Spiritual Health History and Assessment/Progress Note  [facility]    (P) Initial Encounter,  ,  ,      Name: Derick Sanchez III MRN: [id number]    Age: 24 y.o.     Sex: male   Language: English   Samaritan: None   Multiple fracture     Date: 1/10/2025            Total Time Calculated: (P) 8 min              Spiritual Assessment began in [facility]        Referral/Consult From: (P) Rounding   Encounter Overview/Reason: (P) Initial Encounter  Service Provided For: (P) Patient    Stefany, Belief, Meaning:   Patient Other: None  Family/Friends No family/friends present      Importance and Influence:  Patient Other: None  Family/Friends No family/friends present    Community:  Patient Other: No connection to spiritual community  Family/Friends No family/friends present    Assessment and Plan of Care:     Patient Interventions include: Facilitated expression of thoughts and feelings  Family/Friends Interventions include: No family/friends present    Patient Plan of Care: Spiritual Care available upon further referral  Family/Friends Plan of Care: Spiritual Care available upon further referral and No family/friends present    Electronically signed by JOSE Aldana on 1/10/2025 at 6:38 PM
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
## 2025-01-10 NOTE — PLAN OF CARE
Problem: Discharge Planning  Goal: Discharge to home or other facility with appropriate resources  1/10/2025 0059 by Fidencio Woodward, RN  Outcome: Progressing  Flowsheets (Taken 1/9/2025 2015)  Discharge to home or other facility with appropriate resources: Identify barriers to discharge with patient and caregiver     Problem: Safety - Adult  Goal: Free from fall injury  Outcome: Progressing     Problem: Pain  Goal: Verbalizes/displays adequate comfort level or baseline comfort level  Outcome: Progressing  Flowsheets (Taken 1/9/2025 2015)  Verbalizes/displays adequate comfort level or baseline comfort level: Encourage patient to monitor pain and request assistance     Problem: Skin/Tissue Integrity  Goal: Absence of new skin breakdown  Description: 1.  Monitor for areas of redness and/or skin breakdown  2.  Assess vascular access sites hourly  3.  Every 4-6 hours minimum:  Change oxygen saturation probe site  4.  Every 4-6 hours:  If on nasal continuous positive airway pressure, respiratory therapy assess nares and determine need for appliance change or resting period.  Outcome: Progressing

## 2025-01-10 NOTE — PLAN OF CARE
Problem: Discharge Planning  Goal: Discharge to home or other facility with appropriate resources  1/10/2025 1343 by Emily Bowen LPN  Outcome: Progressing  Flowsheets (Taken 1/10/2025 1343)  Discharge to home or other facility with appropriate resources: Identify barriers to discharge with patient and caregiver     Problem: Self Harm/Suicidality  Goal: Will have no self-injury during hospital stay  Description: INTERVENTIONS:  1.  Ensure constant observer at bedside with Q15M safety checks  2.  Maintain a safe environment  3.  Secure patient belongings  4.  Ensure family/visitors adhere to safety recommendations  5.  Ensure safety tray has been added to patient's diet order  6.  Every shift and PRN: Re-assess suicidal risk via Frequent Screener    1/10/2025 1343 by Emily Bowen LPN  Outcome: Progressing  Flowsheets (Taken 1/10/2025 1343)  Will have no self-injury during hospital stay: Maintain a safe environment     Problem: Safety - Adult  Goal: Free from fall injury  1/10/2025 1343 by Emily Bowen LPN  Outcome: Progressing  Flowsheets (Taken 1/10/2025 1343)  Free From Fall Injury: Instruct family/caregiver on patient safety     Problem: Pain  Goal: Verbalizes/displays adequate comfort level or baseline comfort level  1/10/2025 1343 by Emily Bowen LPN  Outcome: Progressing  Flowsheets (Taken 1/10/2025 1343)  Verbalizes/displays adequate comfort level or baseline comfort level:   Encourage patient to monitor pain and request assistance   Assess pain using appropriate pain scale   Administer analgesics based on type and severity of pain and evaluate response   Implement non-pharmacological measures as appropriate and evaluate response     Problem: Skin/Tissue Integrity  Goal: Absence of new skin breakdown  Description: 1.  Monitor for areas of redness and/or skin breakdown  2.  Assess vascular access sites hourly  3.  Every 4-6 hours minimum:  Change oxygen saturation probe

## 2025-01-11 PROCEDURE — 97530 THERAPEUTIC ACTIVITIES: CPT

## 2025-01-11 PROCEDURE — 6360000002 HC RX W HCPCS: Performed by: PHYSICAL MEDICINE & REHABILITATION

## 2025-01-11 PROCEDURE — 97110 THERAPEUTIC EXERCISES: CPT

## 2025-01-11 PROCEDURE — 6370000000 HC RX 637 (ALT 250 FOR IP): Performed by: PHYSICAL MEDICINE & REHABILITATION

## 2025-01-11 PROCEDURE — 6370000000 HC RX 637 (ALT 250 FOR IP): Performed by: FAMILY MEDICINE

## 2025-01-11 PROCEDURE — 1180000000 HC REHAB R&B

## 2025-01-11 PROCEDURE — 97112 NEUROMUSCULAR REEDUCATION: CPT

## 2025-01-11 PROCEDURE — 97116 GAIT TRAINING THERAPY: CPT

## 2025-01-11 RX ADMIN — CALCIUM 500 MG: 500 TABLET ORAL at 08:17

## 2025-01-11 RX ADMIN — ENOXAPARIN SODIUM 40 MG: 100 INJECTION SUBCUTANEOUS at 08:16

## 2025-01-11 RX ADMIN — ACETAMINOPHEN 650 MG: 325 TABLET ORAL at 08:17

## 2025-01-11 RX ADMIN — ACETAMINOPHEN 650 MG: 325 TABLET ORAL at 19:57

## 2025-01-11 RX ADMIN — Medication 5000 UNITS: at 08:17

## 2025-01-11 RX ADMIN — Medication 1 TABLET: at 08:17

## 2025-01-11 RX ADMIN — Medication 6 MG: at 19:57

## 2025-01-11 RX ADMIN — GABAPENTIN 300 MG: 300 CAPSULE ORAL at 21:00

## 2025-01-11 RX ADMIN — METHOCARBAMOL 500 MG: 500 TABLET ORAL at 08:18

## 2025-01-11 RX ADMIN — METHOCARBAMOL 500 MG: 500 TABLET ORAL at 19:57

## 2025-01-11 RX ADMIN — CALCIUM 500 MG: 500 TABLET ORAL at 15:45

## 2025-01-11 RX ADMIN — GABAPENTIN 300 MG: 300 CAPSULE ORAL at 15:43

## 2025-01-11 RX ADMIN — ACETAMINOPHEN 650 MG: 325 TABLET ORAL at 15:44

## 2025-01-11 RX ADMIN — SERTRALINE HYDROCHLORIDE 50 MG: 50 TABLET ORAL at 08:17

## 2025-01-11 RX ADMIN — METHOCARBAMOL 500 MG: 500 TABLET ORAL at 15:44

## 2025-01-11 RX ADMIN — GABAPENTIN 300 MG: 300 CAPSULE ORAL at 05:50

## 2025-01-11 ASSESSMENT — PAIN SCALES - GENERAL
PAINLEVEL_OUTOF10: 0
PAINLEVEL_OUTOF10: 2

## 2025-01-11 ASSESSMENT — PAIN DESCRIPTION - ORIENTATION: ORIENTATION: RIGHT

## 2025-01-11 ASSESSMENT — PAIN DESCRIPTION - LOCATION: LOCATION: HIP

## 2025-01-11 ASSESSMENT — PAIN DESCRIPTION - DESCRIPTORS: DESCRIPTORS: ACHING;DISCOMFORT
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
## 2025-01-11 NOTE — PROGRESS NOTES
Patient: Derick Sanchez III  Unit/Bed: 8K-02/002-A  YOB: 2000  MRN: [id number] Acct: [id number]   Admitting Diagnosis: Fractures [T07.XXXA]  Multiple fracture [T07.XXXA]  Admit Date:  1/7/2025  Hospital Day: 4    Assessment:     Principal Problem:    Multiple fracture  Active Problems:    Closed nondisplaced fracture of left patella    Closed fracture of shaft of right femur, initial encounter ([facility])    Traumatic injury of wrist with intra-articular fracture of radius determined by x-ray    Injury of left ulnar nerve    Injury of left median nerve    Contracture, left wrist  Resolved Problems:    * No resolved hospital problems. *      Plan:     Continue to follow        Subjective:     Patient has no complaint of CP or SOB..   Medication side effects: none    Scheduled Meds:   Vitamin D  5,000 Units Oral Daily    multivitamin  1 tablet Oral Daily    calcium elemental  500 mg Oral BID WC    bacitracin  1 Application Topical BID    melatonin  6 mg Oral Nightly    methocarbamol  500 mg Oral TID    nicotine  1 patch TransDERmal Daily    sertraline  50 mg Oral Daily    gabapentin  300 mg Oral q8h    acetaminophen  650 mg Oral Q6H    docusate sodium  100 mg Oral BID    senna  2 tablet Oral Nightly    naloxegol  12.5 mg Oral QAM AC    enoxaparin  40 mg SubCUTAneous Q24H     Continuous Infusions:  PRN Meds:oxyCODONE **OR** oxyCODONE, diclofenac sodium, acetaminophen, bisacodyl, magnesium hydroxide, traZODone, ondansetron, loperamide, docusate sodium, potassium chloride **OR** potassium alternative oral replacement **OR** potassium chloride, sodium phosphate 15 mmol in sodium chloride 0.9 % 250 mL IVPB, magnesium sulfate, polyethylene glycol    Review of Systems  Pertinent items are noted in HPI.    Objective:     Patient Vitals for the past 8 hrs:   BP Temp Temp src Pulse Resp SpO2   01/11/25 0800 122/75 98.2 °F (36.8 °C) Oral 91 18 97 %     I/O last 3 completed shifts:  In: 1400 [P.O.:1400]  Out: 2050

## 2025-01-11 NOTE — CARE COORDINATION
Patient up to chair all day, participated in all therapies patient had family up most of day. No further concerns at this time

## 2025-01-11 NOTE — PROGRESS NOTES
extension)  Right Upper Extremity Weight Bearing: Non Weight Bearing  Left Upper Extremity Weight Bearing:  (Per Berna from ortho: \"Okay for platform walker with splint\")  Position Activity Restriction  Other Position/Activity Restrictions: LUE resting hand splint to be worn at all times. Per Berna (ortho) via perfect serve: Okay for attempted digital ROM, but otherwise only remove splint for hygiene for LUE.     Social/Functional History:  Lives With: Family (Sister)  Type of Home: House  Home Layout: Two level, Performs ADL's on one level  Home Access: Level entry (1 step into laundry room and one step into kitchen)  Home Equipment: None   Bathroom Shower/Tub: Tub/Shower unit, Curtain  Bathroom Toilet: Standard  Bathroom Accessibility: Accessible, Walker accessible       Prior Level of Assist for ADLs: Independent  Prior Level of Assist for Homemaking: Independent  Prior Level of Assist for Transfers: Independent  Prior Level of Assist for Ambulation: Independent household ambulator, with or without device  Has the patient had two or more falls in the past year or any fall with injury in the past year?: No    Active : Yes  Mode of Transportation: Car  Occupation: Full time employment  Type of Occupation: [employer]  Leisure & Hobbies: Work  Additional Comments: Pt. reports high PLOF with independence with all daily occupations, transfers, mobility without use of AD. Pt. was working full time.  Pt.'s sister works throughout the day. Pt. reports he was going to OP OT ~2x/wk for LUE injury. Patient reports his step mom will be able to be at home to assist as needed at discharge    SUBJECTIVE: Pt sitting in bedside chair upon arrival. Pt pleasant and agreeable to OT session.    PAIN: denies    Vitals: Vitals not assessed per clinical judgement, see nursing flowsheet    COGNITION: WNL    ADL:   No ADL's completed this session..    IADL:   Meal Preparation: Pt engaged in IADL task brewing coffee in therapy  complete simple IADL routines with Supervision with modifications/adpatations as needed to progress to PLOF.  Long Term Goal 3: Pt. to demo Supervision during community re-entry task completion to improve performance with IADLs.    Following session, patient left in safe position with all fall risk precautions in place.

## 2025-01-11 NOTE — PLAN OF CARE
Problem: Discharge Planning  Goal: Discharge to home or other facility with appropriate resources  Outcome: Progressing  Flowsheets (Taken 1/11/2025 1011)  Discharge to home or other facility with appropriate resources:   Identify barriers to discharge with patient and caregiver   Identify discharge learning needs (meds, wound care, etc)   Refer to discharge planning if patient needs post-hospital services based on physician order or complex needs related to functional status, cognitive ability or social support system   Arrange for needed discharge resources and transportation as appropriate  Note: No discharge date will discuss in team conference      Problem: Self Harm/Suicidality  Goal: Will have no self-injury during hospital stay  Description: INTERVENTIONS:  1.  Ensure constant observer at bedside with Q15M safety checks  2.  Maintain a safe environment  3.  Secure patient belongings  4.  Ensure family/visitors adhere to safety recommendations  5.  Ensure safety tray has been added to patient's diet order  6.  Every shift and PRN: Re-assess suicidal risk via Frequent Screener    Outcome: Progressing  Flowsheets (Taken 1/11/2025 1011)  Will have no self-injury during hospital stay: Maintain a safe environment     Problem: Safety - Adult  Goal: Free from fall injury  1/11/2025 1011 by Bev Tyler LPN  Outcome: Progressing  Flowsheets (Taken 1/11/2025 1011)  Free From Fall Injury: Instruct family/caregiver on patient safety  1/10/2025 2309 by Nancy Clark, RN  Outcome: Progressing  Flowsheets (Taken 1/10/2025 2309)  Free From Fall Injury: Instruct family/caregiver on patient safety     Problem: Pain  Goal: Verbalizes/displays adequate comfort level or baseline comfort level  1/11/2025 1011 by Bev Tyler LPN  Outcome: Progressing  Flowsheets (Taken 1/10/2025 2309 by Nancy Clark, RN)  Verbalizes/displays adequate comfort level or baseline comfort level:   Assess pain using appropriate pain

## 2025-01-11 NOTE — PLAN OF CARE
Problem: Safety - Adult  Goal: Free from fall injury  1/10/2025 2309 by Nancy Clark RN  Outcome: Progressing  Flowsheets (Taken 1/10/2025 2309)  Free From Fall Injury: Instruct family/caregiver on patient safety     Problem: Pain  Goal: Verbalizes/displays adequate comfort level or baseline comfort level  1/10/2025 2309 by Nancy Clark RN  Outcome: Progressing  Flowsheets (Taken 1/10/2025 2309)  Verbalizes/displays adequate comfort level or baseline comfort level:   Assess pain using appropriate pain scale   Encourage patient to monitor pain and request assistance     Problem: Skin/Tissue Integrity  Goal: Absence of new skin breakdown  Description: 1.  Monitor for areas of redness and/or skin breakdown  2.  Assess vascular access sites hourly  3.  Every 4-6 hours minimum:  Change oxygen saturation probe site  4.  Every 4-6 hours:  If on nasal continuous positive airway pressure, respiratory therapy assess nares and determine need for appliance change or resting period.  1/10/2025 2309 by Nancy Clark, RN  Outcome: Progressing

## 2025-01-12 PROCEDURE — 6370000000 HC RX 637 (ALT 250 FOR IP): Performed by: FAMILY MEDICINE

## 2025-01-12 PROCEDURE — 6370000000 HC RX 637 (ALT 250 FOR IP): Performed by: PHYSICAL MEDICINE & REHABILITATION

## 2025-01-12 PROCEDURE — 6360000002 HC RX W HCPCS: Performed by: PHYSICAL MEDICINE & REHABILITATION

## 2025-01-12 PROCEDURE — 1180000000 HC REHAB R&B

## 2025-01-12 RX ADMIN — ACETAMINOPHEN 650 MG: 325 TABLET ORAL at 20:39

## 2025-01-12 RX ADMIN — METHOCARBAMOL 500 MG: 500 TABLET ORAL at 09:55

## 2025-01-12 RX ADMIN — BACITRACIN 1 APPLICATION: 500 OINTMENT TOPICAL at 20:38

## 2025-01-12 RX ADMIN — BACITRACIN 1 APPLICATION: 500 OINTMENT TOPICAL at 09:57

## 2025-01-12 RX ADMIN — CALCIUM 500 MG: 500 TABLET ORAL at 17:22

## 2025-01-12 RX ADMIN — METHOCARBAMOL 500 MG: 500 TABLET ORAL at 20:39

## 2025-01-12 RX ADMIN — Medication 5000 UNITS: at 09:55

## 2025-01-12 RX ADMIN — GABAPENTIN 300 MG: 300 CAPSULE ORAL at 20:39

## 2025-01-12 RX ADMIN — Medication 1 TABLET: at 09:55

## 2025-01-12 RX ADMIN — CALCIUM 500 MG: 500 TABLET ORAL at 09:55

## 2025-01-12 RX ADMIN — ACETAMINOPHEN 650 MG: 325 TABLET ORAL at 09:55

## 2025-01-12 RX ADMIN — Medication 6 MG: at 20:39

## 2025-01-12 RX ADMIN — ENOXAPARIN SODIUM 40 MG: 100 INJECTION SUBCUTANEOUS at 09:52

## 2025-01-12 RX ADMIN — ACETAMINOPHEN 650 MG: 325 TABLET ORAL at 13:38

## 2025-01-12 RX ADMIN — SERTRALINE HYDROCHLORIDE 50 MG: 50 TABLET ORAL at 09:55

## 2025-01-12 RX ADMIN — GABAPENTIN 300 MG: 300 CAPSULE ORAL at 06:27

## 2025-01-12 RX ADMIN — METHOCARBAMOL 500 MG: 500 TABLET ORAL at 13:38

## 2025-01-12 RX ADMIN — GABAPENTIN 300 MG: 300 CAPSULE ORAL at 13:38

## 2025-01-12 NOTE — PROGRESS NOTES
ROM at the rest of bilateral upper & lower extremities  Cerebral :  alert ; awake ; oriented to place, person and time; follow 2-3 step verbal command  Cerebellum : no dysmetria with bilateral finger-to-nose test ; bilateral heel-to-shin test not performed; no dysdiadochokinesia with bilateral forearm rapid supination/pronation  Cranial Nerves :  grossly intact CN II to XII function  Sensory : intact light touch and pin prick sensation at right upper extremities; reduced pinprick and light touch sensation at left hand or fingertips compared to right side; intact and symmetrical light touch and pinprick sensations at bilateral lower extremities  Motor : normal muscle tone at bilateral upper extremities ; lower extremities muscle tone not assessed; 4+/5 to 5/5 muscle strength at the right finger extension, abduction and flexion; 4-/5 to 4+/5 muscle strength at left finger flexion & extension ; 2-/5 muscle strength at left finger abduction; bilateral wrist muscle strength not tested; 4+/5 muscle strength at the right hip flexion; 4+/5 to 5/5 muscle strength at the right knee extension; 4+/5 muscle strength at the right knee flexion; left knee muscle strength not tested; normal 5/5 muscle strength at the rest of bilateral upper & lower extremities  Reflex : 1+ bilateral bicep, bilateral brachioradialis reflexes; bilateral knee reflexes not tested  Pathological Reflex :  No Desiree's sign ; no ankle clonus  Gait : Not assessed      Diagnostics:   No results found for this or any previous visit (from the past 24 hour(s)).    X-ray of right wrist (1/10/2025) :  FINDINGS:  Images were obtained with a plaster cast in place. No bony displacement is seen. There is a questionable nondisplaced fracture of the distal radius   IMPRESSION:  Questionable nondisplaced fracture distal radius. No bony displacement seen.        X-ray of right knee (1/10/2025) :  FINDINGS:   A medullary karlie has recently been inserted into the right  prior to discharge.  Family education and training will be completed.  Equipment evaluations and recommendations will be completed as appropriate.       Rehabilitation nursing continue to be involved for bowel, bladder, skin, and pain management.  Nursing will also provide education and training to patient and family.    Prophylaxis:  DVT: Lovenox, CHERYL stocking, intermittent pneumatic compression device.  GI: Colace, Senokot, Movantik, GlycoLax as needed, milk of magnesium as needed, Dulcolax suppository as needed, Zofran as needed, Enemeez enema as needed, Imodium as needed  Pain: Tylenol,, Robaxin, Tylenol as needed; reduce oxycodone to 2.5 to 5 mg as needed, Voltaren gel application as needed  Continue Norvasc for hypertension  Continue bacitracin ointment application to laceration wound  Continue Neurontin for left hand neuropathic pain  Continue NicoDerm patch for tobacco dependency  Continue Zoloft for depression  Continue melatonin, trazodone as needed for insomnia  Nutrition:  Continue regular diet  Bladder: Monitoring signs or symptoms of UTI  Bowel: Monitoring signs or symptoms of constipation   and case management for coordination of care and discharge planning      Missed Therapy Time:  None      FINN GARCIA MD     This report has been created using voice recognition software. It may contain minor errors which are inherent in voice recognition technology

## 2025-01-12 NOTE — PLAN OF CARE
Problem: Safety - Adult  Goal: Free from fall injury  1/11/2025 2130 by Nancy Clark RN  Outcome: Progressing  Flowsheets (Taken 1/11/2025 2130)  Free From Fall Injury: Instruct family/caregiver on patient safety     Problem: Pain  Goal: Verbalizes/displays adequate comfort level or baseline comfort level  1/11/2025 2130 by Nancy Clark RN  Outcome: Progressing  Flowsheets (Taken 1/11/2025 2130)  Verbalizes/displays adequate comfort level or baseline comfort level:   Encourage patient to monitor pain and request assistance   Assess pain using appropriate pain scale     Problem: Skin/Tissue Integrity  Goal: Absence of new skin breakdown  Description: 1.  Monitor for areas of redness and/or skin breakdown  2.  Assess vascular access sites hourly  3.  Every 4-6 hours minimum:  Change oxygen saturation probe site  4.  Every 4-6 hours:  If on nasal continuous positive airway pressure, respiratory therapy assess nares and determine need for appliance change or resting period.  1/11/2025 2130 by Nancy Clark, RN  Outcome: Progressing

## 2025-01-12 NOTE — CARE COORDINATION
Patient is up x1 with platform walker and NWB to RUE. 25 Staples and 1 suture removed this shift per order, patient tolerated well. Bed/chair alarm on and call light within reach.

## 2025-01-12 NOTE — PLAN OF CARE
Problem: Discharge Planning  Goal: Discharge to home or other facility with appropriate resources  Outcome: Progressing  Flowsheets (Taken 1/12/2025 1327)  Discharge to home or other facility with appropriate resources:   Identify barriers to discharge with patient and caregiver   Arrange for needed discharge resources and transportation as appropriate   Identify discharge learning needs (meds, wound care, etc)   Refer to discharge planning if patient needs post-hospital services based on physician order or complex needs related to functional status, cognitive ability or social support system     Problem: Self Harm/Suicidality  Goal: Will have no self-injury during hospital stay  Description: INTERVENTIONS:  1.  Ensure constant observer at bedside with Q15M safety checks  2.  Maintain a safe environment  3.  Secure patient belongings  4.  Ensure family/visitors adhere to safety recommendations  5.  Ensure safety tray has been added to patient's diet order  6.  Every shift and PRN: Re-assess suicidal risk via Frequent Screener    Outcome: Progressing     Problem: Safety - Adult  Goal: Free from fall injury  Outcome: Progressing  Flowsheets (Taken 1/12/2025 1327)  Free From Fall Injury: Instruct family/caregiver on patient safety     Problem: Pain  Goal: Verbalizes/displays adequate comfort level or baseline comfort level  Outcome: Progressing  Flowsheets (Taken 1/12/2025 1327)  Verbalizes/displays adequate comfort level or baseline comfort level:   Encourage patient to monitor pain and request assistance   Assess pain using appropriate pain scale   Administer analgesics based on type and severity of pain and evaluate response   Implement non-pharmacological measures as appropriate and evaluate response   Notify Licensed Independent Practitioner if interventions unsuccessful or patient reports new pain     Problem: Skin/Tissue Integrity  Goal: Absence of new skin breakdown  Description: 1.  Monitor for areas of

## 2025-01-13 PROCEDURE — 6370000000 HC RX 637 (ALT 250 FOR IP): Performed by: FAMILY MEDICINE

## 2025-01-13 PROCEDURE — 99232 SBSQ HOSP IP/OBS MODERATE 35: CPT | Performed by: PHYSICAL MEDICINE & REHABILITATION

## 2025-01-13 PROCEDURE — 97535 SELF CARE MNGMENT TRAINING: CPT

## 2025-01-13 PROCEDURE — 97530 THERAPEUTIC ACTIVITIES: CPT

## 2025-01-13 PROCEDURE — 6360000002 HC RX W HCPCS: Performed by: PHYSICAL MEDICINE & REHABILITATION

## 2025-01-13 PROCEDURE — 97112 NEUROMUSCULAR REEDUCATION: CPT

## 2025-01-13 PROCEDURE — 97110 THERAPEUTIC EXERCISES: CPT

## 2025-01-13 PROCEDURE — 1180000000 HC REHAB R&B

## 2025-01-13 PROCEDURE — 6370000000 HC RX 637 (ALT 250 FOR IP): Performed by: PHYSICAL MEDICINE & REHABILITATION

## 2025-01-13 PROCEDURE — 97116 GAIT TRAINING THERAPY: CPT

## 2025-01-13 PROCEDURE — 97140 MANUAL THERAPY 1/> REGIONS: CPT

## 2025-01-13 RX ADMIN — GABAPENTIN 300 MG: 300 CAPSULE ORAL at 20:56

## 2025-01-13 RX ADMIN — SERTRALINE HYDROCHLORIDE 50 MG: 50 TABLET ORAL at 09:44

## 2025-01-13 RX ADMIN — CALCIUM 500 MG: 500 TABLET ORAL at 17:53

## 2025-01-13 RX ADMIN — ACETAMINOPHEN 650 MG: 325 TABLET ORAL at 09:42

## 2025-01-13 RX ADMIN — ENOXAPARIN SODIUM 40 MG: 100 INJECTION SUBCUTANEOUS at 09:42

## 2025-01-13 RX ADMIN — ACETAMINOPHEN 650 MG: 325 TABLET ORAL at 20:56

## 2025-01-13 RX ADMIN — METHOCARBAMOL 500 MG: 500 TABLET ORAL at 09:44

## 2025-01-13 RX ADMIN — Medication 12.5 MG: at 06:29

## 2025-01-13 RX ADMIN — GABAPENTIN 300 MG: 300 CAPSULE ORAL at 06:28

## 2025-01-13 RX ADMIN — CALCIUM 500 MG: 500 TABLET ORAL at 09:43

## 2025-01-13 RX ADMIN — ACETAMINOPHEN 650 MG: 325 TABLET ORAL at 14:12

## 2025-01-13 RX ADMIN — METHOCARBAMOL 500 MG: 500 TABLET ORAL at 20:56

## 2025-01-13 RX ADMIN — METHOCARBAMOL 500 MG: 500 TABLET ORAL at 14:14

## 2025-01-13 RX ADMIN — Medication 5000 UNITS: at 09:44

## 2025-01-13 RX ADMIN — GABAPENTIN 300 MG: 300 CAPSULE ORAL at 14:14

## 2025-01-13 RX ADMIN — Medication 1 TABLET: at 09:44

## 2025-01-13 RX ADMIN — Medication 6 MG: at 20:56

## 2025-01-13 ASSESSMENT — ENCOUNTER SYMPTOMS
CONSTIPATION: 0
SHORTNESS OF BREATH: 0
WHEEZING: 0
DIARRHEA: 0
TROUBLE SWALLOWING: 0
SORE THROAT: 0
VOMITING: 0
NAUSEA: 0
ABDOMINAL PAIN: 0
COUGH: 0
BACK PAIN: 0
RHINORRHEA: 0

## 2025-01-13 ASSESSMENT — PAIN DESCRIPTION - LOCATION: LOCATION: KNEE

## 2025-01-13 ASSESSMENT — PAIN DESCRIPTION - ORIENTATION: ORIENTATION: RIGHT

## 2025-01-13 ASSESSMENT — PAIN DESCRIPTION - DESCRIPTORS: DESCRIPTORS: ACHING

## 2025-01-13 NOTE — PROGRESS NOTES
Patient is up in chair. Patient is A&O x4. Speech is clear. Pupils are bilaterally reactive. Mucus membranes are pink and moist. Lungs sounds clear throughout and heart rate is regular. Upper extremities are bilaterally pink warm and dry. No numbness or tingling. ROM present. Right Hand grasp weak , Left hand grasp ARAVIND due to brace. Skin turgor < 3 sec and capillary refill < 3 sec. Bowel sounds active in all 4 quads. Lower Extremities are pink, warm and dry. ROM present. No numbness or tingling present. pedal pulses moderate, pedal push and pull moderate.
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
## 2025-01-13 NOTE — CARE COORDINATION
Patient eager to do therapy this shift. Had a good day. No complaints. Resting in chair with call light in reach.     Patient educated on how to use incentive spirometer. Patient verbalized understanding and demonstrated proper use. Emphasized importance and usage of device, with coughing and deep breathing every 2 hours while awake.
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
## 2025-01-13 NOTE — PLAN OF CARE
Problem: Discharge Planning  Goal: Discharge to home or other facility with appropriate resources  1/12/2025 2351 by Fidencio Woodward, RN  Outcome: Progressing  Flowsheets (Taken 1/12/2025 2030)  Discharge to home or other facility with appropriate resources: Identify barriers to discharge with patient and caregiver     Problem: Safety - Adult  Goal: Free from fall injury  1/12/2025 2351 by Fidencio Woodward, RN  Outcome: Progressing     Problem: Pain  Goal: Verbalizes/displays adequate comfort level or baseline comfort level  1/12/2025 2351 by Fdiencio Woodward, RN  Outcome: Progressing  Flowsheets (Taken 1/12/2025 2030)  Verbalizes/displays adequate comfort level or baseline comfort level: Encourage patient to monitor pain and request assistance     Problem: Skin/Tissue Integrity  Goal: Absence of new skin breakdown  Description: 1.  Monitor for areas of redness and/or skin breakdown  2.  Assess vascular access sites hourly  3.  Every 4-6 hours minimum:  Change oxygen saturation probe site  4.  Every 4-6 hours:  If on nasal continuous positive airway pressure, respiratory therapy assess nares and determine need for appliance change or resting period.  1/12/2025 2351 by Fidencio Woodward, RN  Outcome: Progressing
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
## 2025-01-13 NOTE — PROGRESS NOTES
Patient is back on unit and is sitting up in the chair. I let primary nurse Jennifer know he was back from PT and reported off to her.

## 2025-01-13 NOTE — PLAN OF CARE
Problem: Discharge Planning  Goal: Discharge to home or other facility with appropriate resources  1/13/2025 1041 by Anamaria Dewey LPN  Outcome: Completed  Flowsheets (Taken 1/13/2025 0930)  Discharge to home or other facility with appropriate resources: Identify barriers to discharge with patient and caregiver     Problem: Self Harm/Suicidality  Goal: Will have no self-injury during hospital stay  Description: INTERVENTIONS:    2.  Maintain a safe environment  3.  Secure patient belongings  4.  Ensure family/visitors adhere to safety recommendations  5.  Ensure safety tray has been added to patient's diet order  6.  Every shift and PRN: Re-assess suicidal risk via Frequent Screener    1/13/2025 1041 by Anamaria Dewey LPN  Flowsheets (Taken 1/13/2025 0930)  Will have no self-injury during hospital stay: Maintain a safe environment     Problem: Safety - Adult  Goal: Free from fall injury  1/13/2025 1041 by Anamaria Dewey LPN       Problem: Pain  Goal: Verbalizes/displays adequate comfort level or baseline comfort level  1/13/2025 1041 by Anamaria Dewey LPN    Problem: Pain  Goal: Verbalizes/displays adequate comfort level or baseline comfort level  Outcome: Progressing  Flowsheets (Taken 1/13/2025 0930)  Verbalizes/displays adequate comfort level or baseline comfort level:   Encourage patient to monitor pain and request assistance   Assess pain using appropriate pain scale     Problem: Skin/Tissue Integrity  Goal: Absence of new skin breakdown  Description: 1.  Monitor for areas of redness and/or skin breakdown  2.  Assess vascular access sites hourly  3.  Every 4-6 hours minimum:  Change oxygen saturation probe site  4.  Every 4-6 hours:  If on nasal continuous positive airway pressure, respiratory therapy assess nares and determine need for appliance change or resting period.  1/13/2025 1041 by Anamaria Dewey LPN  Outcome: Progressing     Problem: Nutrition Deficit:  Goal: Optimize nutritional status  1/13/2025

## 2025-01-13 NOTE — PROGRESS NOTES
Reassessed patients pain level, patient states it was a 1 on scale of 1-10 in the L knee. Patient is leaving unit to go to PT.

## 2025-01-13 NOTE — PROGRESS NOTES
Neuromuscular re-education, Balance training, Functional mobility training, Safety education & training, Equipment evaluation, education, & procurement, ROM, Endurance training, Gait training, Patient/Caregiver education & training, Self-Care / ADL, Home management training, Modalities, Positioning, Pain management, Return to work related activity, Coordination training    Education:  Learners: Patient  ADL's, IADL's, Home Exercise Program, Precautions, Reviewed Prior Education, Home Safety, Fall Prevention, and Assistive Device Safety    Goals  Short Term Goals  Time Frame for Short Term Goals: 1 week  Short Term Goal 1: Pt. to tolerate 5 minutes of standing with SBA with 1-2 hand release to improve activity tolerance for ADL completion.  Short Term Goal 2: Pt. to retrieve/transport at least 3 items with SBA to maximize performance with self care prep.  Short Term Goal 3: Pt. to complete LB dressing with Min A with use of AE prn to enhance performance with ADLs.  Short Term Goal 4: Pt. to open/close at least 4/5 varying conatiners to maximize performance with daily occupations.  Long Term Goals  Time Frame for Long Term Goals : 3 weeks from OT mary carmen on the IPR unit  Long Term Goal 1: Pt. to demo improved occupational performance as evidenced by a score of 75/100 on the Modified Barthel Index.  Long Term Goal 2: Pt. to complete simple IADL routines with Supervision with modifications/adpatations as needed to progress to PLOF.  Long Term Goal 3: Pt. to demo Supervision during community re-entry task completion to improve performance with IADLs.    Following session, patient left in safe position with all fall risk precautions in place.

## 2025-01-14 PROCEDURE — 97110 THERAPEUTIC EXERCISES: CPT

## 2025-01-14 PROCEDURE — 97530 THERAPEUTIC ACTIVITIES: CPT

## 2025-01-14 PROCEDURE — 6370000000 HC RX 637 (ALT 250 FOR IP): Performed by: PHYSICAL MEDICINE & REHABILITATION

## 2025-01-14 PROCEDURE — 6370000000 HC RX 637 (ALT 250 FOR IP): Performed by: FAMILY MEDICINE

## 2025-01-14 PROCEDURE — 1180000000 HC REHAB R&B

## 2025-01-14 PROCEDURE — 6360000002 HC RX W HCPCS: Performed by: PHYSICAL MEDICINE & REHABILITATION

## 2025-01-14 PROCEDURE — 97112 NEUROMUSCULAR REEDUCATION: CPT

## 2025-01-14 PROCEDURE — 97116 GAIT TRAINING THERAPY: CPT

## 2025-01-14 PROCEDURE — 99232 SBSQ HOSP IP/OBS MODERATE 35: CPT | Performed by: PHYSICAL MEDICINE & REHABILITATION

## 2025-01-14 PROCEDURE — 97535 SELF CARE MNGMENT TRAINING: CPT

## 2025-01-14 RX ADMIN — GABAPENTIN 300 MG: 300 CAPSULE ORAL at 23:47

## 2025-01-14 RX ADMIN — GABAPENTIN 300 MG: 300 CAPSULE ORAL at 16:42

## 2025-01-14 RX ADMIN — GABAPENTIN 300 MG: 300 CAPSULE ORAL at 06:18

## 2025-01-14 RX ADMIN — ACETAMINOPHEN 650 MG: 325 TABLET ORAL at 16:42

## 2025-01-14 RX ADMIN — METHOCARBAMOL 500 MG: 500 TABLET ORAL at 20:13

## 2025-01-14 RX ADMIN — ACETAMINOPHEN 650 MG: 325 TABLET ORAL at 08:19

## 2025-01-14 RX ADMIN — Medication 1 TABLET: at 08:18

## 2025-01-14 RX ADMIN — CALCIUM 500 MG: 500 TABLET ORAL at 16:42

## 2025-01-14 RX ADMIN — ENOXAPARIN SODIUM 40 MG: 100 INJECTION SUBCUTANEOUS at 08:19

## 2025-01-14 RX ADMIN — ACETAMINOPHEN 650 MG: 325 TABLET ORAL at 20:12

## 2025-01-14 RX ADMIN — Medication 6 MG: at 20:12

## 2025-01-14 RX ADMIN — Medication 5000 UNITS: at 08:18

## 2025-01-14 RX ADMIN — METHOCARBAMOL 500 MG: 500 TABLET ORAL at 16:42

## 2025-01-14 RX ADMIN — BACITRACIN 1 APPLICATION: 500 OINTMENT TOPICAL at 08:23

## 2025-01-14 RX ADMIN — METHOCARBAMOL 500 MG: 500 TABLET ORAL at 08:18

## 2025-01-14 RX ADMIN — SERTRALINE HYDROCHLORIDE 50 MG: 50 TABLET ORAL at 08:18

## 2025-01-14 RX ADMIN — Medication 12.5 MG: at 06:18

## 2025-01-14 RX ADMIN — CALCIUM 500 MG: 500 TABLET ORAL at 08:19

## 2025-01-14 ASSESSMENT — ENCOUNTER SYMPTOMS
ABDOMINAL PAIN: 0
BACK PAIN: 0
CONSTIPATION: 0
COUGH: 0
NAUSEA: 0
SHORTNESS OF BREATH: 0
SORE THROAT: 0
WHEEZING: 0
VOMITING: 0
DIARRHEA: 0
TROUBLE SWALLOWING: 0
RHINORRHEA: 0

## 2025-01-14 ASSESSMENT — PAIN SCALES - GENERAL: PAINLEVEL_OUTOF10: 0

## 2025-01-14 NOTE — PROGRESS NOTES
Patient: Derick Sanchez III  Unit/Bed: 8K-02/002-A  YOB: 2000  MRN: [id number] Acct: [id number]   Admitting Diagnosis: Fractures [T07.XXXA]  Multiple fracture [T07.XXXA]  Admit Date:  1/7/2025  Hospital Day: 6    Assessment:     Principal Problem:    Multiple fracture  Active Problems:    Closed nondisplaced fracture of left patella    Closed fracture of shaft of right femur, initial encounter ([facility])    Traumatic injury of wrist with intra-articular fracture of radius determined by x-ray    Injury of left ulnar nerve    Injury of left median nerve    Contracture, left wrist  Resolved Problems:    * No resolved hospital problems. *      Plan:     Continue to follow.        Subjective:     Patient has no complaint of CP or SOB..   Medication side effects: none    Scheduled Meds:   Vitamin D  5,000 Units Oral Daily    multivitamin  1 tablet Oral Daily    calcium elemental  500 mg Oral BID WC    bacitracin  1 Application Topical BID    melatonin  6 mg Oral Nightly    methocarbamol  500 mg Oral TID    nicotine  1 patch TransDERmal Daily    sertraline  50 mg Oral Daily    gabapentin  300 mg Oral q8h    acetaminophen  650 mg Oral Q6H    docusate sodium  100 mg Oral BID    senna  2 tablet Oral Nightly    naloxegol  12.5 mg Oral QAM AC    enoxaparin  40 mg SubCUTAneous Q24H     Continuous Infusions:  PRN Meds:oxyCODONE **OR** oxyCODONE, diclofenac sodium, acetaminophen, bisacodyl, magnesium hydroxide, traZODone, ondansetron, loperamide, docusate sodium, potassium chloride **OR** potassium alternative oral replacement **OR** potassium chloride, sodium phosphate 15 mmol in sodium chloride 0.9 % 250 mL IVPB, magnesium sulfate, polyethylene glycol    Review of Systems  Pertinent items are noted in HPI.    Objective:     No data found.  I/O last 3 completed shifts:  In: 1630 [P.O.:1630]  Out: 1500 [Urine:1500]  No intake/output data recorded.    /85   Pulse 92   Temp 98.6 °F (37 °C) (Oral)   Resp 18   Ht

## 2025-01-14 NOTE — PROGRESS NOTES
Briefly spoke with patient. Affect is bright, talkative, reports feeling good and hopeful. He and I reviewed guidelines for going home.    I anticipate no need for psychology f/u after discharge, but he has my contact information if needed.
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
## 2025-01-14 NOTE — PLAN OF CARE
Problem: Discharge Planning  Goal: Discharge to home or other facility with appropriate resources  Flowsheets (Taken 1/14/2025 0815 by Esha Bertrand, RN)  Discharge to home or other facility with appropriate resources:   Identify barriers to discharge with patient and caregiver   Arrange for needed discharge resources and transportation as appropriate   Identify discharge learning needs (meds, wound care, etc)   Refer to discharge planning if patient needs post-hospital services based on physician order or complex needs related to functional status, cognitive ability or social support system  Note: SW met with patient on this date to discuss discharge planning. Patient has made good progress on IPR. SW touched base with Dr Tolliver and therapy to discuss patient's progress. With patient's current progress, they feel comfortable with a discharge at the end of this week. SW informed patient of this and potential for denial at the next continued stay review. Patient reported understanding and is in agreement with a potential discharge on Friday, 1/17. Patient would like a referral for outpatient PT and OT at [facility]. Patient has no outstanding needs. Patient requests that RONALDO asks [employer] for more time on IPR. SW will follow and maintain involvement in discharge planning.

## 2025-01-14 NOTE — PROGRESS NOTES
Patient: Derick Sanchez III  Unit/Bed: 8K-02/002-A  YOB: 2000  MRN: [id number] Acct: [id number]   Admitting Diagnosis: Fractures [T07.XXXA]  Multiple fracture [T07.XXXA]  Admit Date:  1/7/2025  Hospital Day: 7    Assessment:     Principal Problem:    Multiple fracture  Active Problems:    Closed nondisplaced fracture of left patella    Closed fracture of shaft of right femur, initial encounter ([facility])    Traumatic injury of wrist with intra-articular fracture of radius determined by x-ray    Injury of left ulnar nerve    Injury of left median nerve    Contracture, left wrist  Resolved Problems:    * No resolved hospital problems. *      Plan:     The BP continues to be acceptable.        Subjective:     Patient has no complaint of CP or SOB..   Medication side effects: none    Scheduled Meds:   Vitamin D  5,000 Units Oral Daily    multivitamin  1 tablet Oral Daily    calcium elemental  500 mg Oral BID WC    bacitracin  1 Application Topical BID    melatonin  6 mg Oral Nightly    methocarbamol  500 mg Oral TID    nicotine  1 patch TransDERmal Daily    sertraline  50 mg Oral Daily    gabapentin  300 mg Oral q8h    acetaminophen  650 mg Oral Q6H    docusate sodium  100 mg Oral BID    senna  2 tablet Oral Nightly    naloxegol  12.5 mg Oral QAM AC    enoxaparin  40 mg SubCUTAneous Q24H     Continuous Infusions:  PRN Meds:oxyCODONE **OR** oxyCODONE, diclofenac sodium, acetaminophen, bisacodyl, magnesium hydroxide, traZODone, ondansetron, loperamide, docusate sodium, potassium chloride **OR** potassium alternative oral replacement **OR** potassium chloride, sodium phosphate 15 mmol in sodium chloride 0.9 % 250 mL IVPB, magnesium sulfate, polyethylene glycol    Review of Systems  Pertinent items are noted in HPI.    Objective:     Patient Vitals for the past 8 hrs:   BP Temp Temp src Pulse Resp SpO2   01/14/25 0815 124/69 98.2 °F (36.8 °C) Oral 79 16 98 %     I/O last 3 completed shifts:  In: 1480  [P.O.:1480]  Out: 2000 [Urine:2000]  No intake/output data recorded.    /69   Pulse 79   Temp 98.2 °F (36.8 °C) (Oral)   Resp 16   Ht 1.74 m (5' 8.5\")   Wt 65.4 kg (144 lb 2.9 oz)   SpO2 98%   BMI 21.60 kg/m²     General appearance: alert, appears stated age, and cooperative  Head: Normocephalic, without obvious abnormality, atraumatic  Lungs: clear to auscultation bilaterally  Chest wall: no tenderness  Heart: regular rate and rhythm, S1, S2 normal, no murmur, click, rub or gallop  Abdomen: soft, non-tender; bowel sounds normal; no masses,  no organomegaly  Extremities:  splints on the forearms and left leg  Skin: Skin color, texture, turgor normal. No rashes or lesions  Neurologic:  weak    Electronically signed by Thaddeus Carroll MD on 1/14/2025 at 11:18 AM
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
## 2025-01-14 NOTE — PLAN OF CARE
Problem: Discharge Planning  Goal: Discharge to home or other facility with appropriate resources  Recent Flowsheet Documentation  Taken 1/13/2025 2110 by Fidencio Woodward RN  Discharge to home or other facility with appropriate resources: Identify barriers to discharge with patient and caregiver     Problem: Safety - Adult  Goal: Free from fall injury  1/13/2025 1041 by Anamaria Dewey LPN  Outcome: Completed     Problem: Pain  Goal: Verbalizes/displays adequate comfort level or baseline comfort level  1/13/2025 1041 by Anamaria Dewey LPN  Outcome: Completed  Flowsheets (Taken 1/13/2025 0930)  Verbalizes/displays adequate comfort level or baseline comfort level:   Encourage patient to monitor pain and request assistance   Assess pain using appropriate pain scale     Problem: Skin/Tissue Integrity  Goal: Absence of new skin breakdown  Description: 1.  Monitor for areas of redness and/or skin breakdown  2.  Assess vascular access sites hourly  3.  Every 4-6 hours minimum:  Change oxygen saturation probe site  4.  Every 4-6 hours:  If on nasal continuous positive airway pressure, respiratory therapy assess nares and determine need for appliance change or resting period.  1/13/2025 1041 by Anamaria Dewey LPN  Outcome: Completed

## 2025-01-14 NOTE — CARE COORDINATION
Patient is oriented and slept well during the shift. Stayed on the chair most of the time. Goes to the bathroom with 1 assist via platform walker. He is a weight bearing as tolerated on BLE. No pain or discomfort noted. Electronically signed by Fidencio Woodward RN on 1/14/2025 at 2:54 AM

## 2025-01-14 NOTE — CARE COORDINATION
Derick Sanchez III was evaluated today and a DME order was entered for a wheeled walker because he requires this to successfully complete daily living tasks of ambulating, hygiene, and dressing lower body.  A wheeled walker is necessary due to the patient's unsteady gait, upper body weakness, and inability to  an ambulation device; and he can ambulate only by pushing a walker instead of a lesser assistive device such as a cane, crutch, or standard walker.  The need for this equipment was discussed with the patient and he understands and is in agreement.     Derick Sanchez III was evaluated today and a DME order was entered for bilateral platform attachments for a wheeled walker.  Patient is unable to ambulate with a rolling walker without bilateral platform attachments due to non-weight bearing status of right wrist and requiring use of a resting hand splint on his left hand/wrist at all times.
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
## 2025-01-14 NOTE — PLAN OF CARE
Problem: Discharge Planning  Goal: Discharge to home or other facility with appropriate resources  Recent Flowsheet Documentation  Taken 1/14/2025 0815 by Esha Bertrand RN  Discharge to home or other facility with appropriate resources:   Identify barriers to discharge with patient and caregiver   Arrange for needed discharge resources and transportation as appropriate   Identify discharge learning needs (meds, wound care, etc)   Refer to discharge planning if patient needs post-hospital services based on physician order or complex needs related to functional status, cognitive ability or social support system  Taken 1/13/2025 2110 by Fidencio Woodward, RN  Discharge to home or other facility with appropriate resources: Identify barriers to discharge with patient and caregiver     Problem: Self Harm/Suicidality  Goal: Will have no self-injury during hospital stay  Description: INTERVENTIONS:  1.  Ensure constant observer at bedside with Q15M safety checks  2.  Maintain a safe environment  3.  Secure patient belongings  4.  Ensure family/visitors adhere to safety recommendations  5.  Ensure safety tray has been added to patient's diet order  6.  Every shift and PRN: Re-assess suicidal risk via Frequent Screener    Recent Flowsheet Documentation  Taken 1/14/2025 0815 by Esha Bertrand, MARCUS  Will have no self-injury during hospital stay: Maintain a safe environment     Problem: Safety - Adult  Goal: Free from fall injury  1/14/2025 1044 by Esha Bertrand RN  Outcome: Progressing  1/14/2025 0024 by Fidencio Woodward, RN  Outcome: Progressing     Problem: Pain  Goal: Verbalizes/displays adequate comfort level or baseline comfort level  1/14/2025 1044 by Esha Bertrand RN  Outcome: Progressing  1/14/2025 0024 by Fidencio Woodward, RN  Outcome: Progressing     Problem: Skin/Tissue Integrity - Adult  Goal: Incisions, wounds, or drain sites healing without S/S of infection  Outcome: Progressing      Problem: Musculoskeletal - Adult  Goal: Return mobility to safest level of function  Outcome: Progressing  Goal: Maintain proper alignment of affected body part  Outcome: Progressing  Goal: Return ADL status to a safe level of function  Outcome: Progressing

## 2025-01-15 PROCEDURE — 97110 THERAPEUTIC EXERCISES: CPT

## 2025-01-15 PROCEDURE — 6360000002 HC RX W HCPCS: Performed by: PHYSICAL MEDICINE & REHABILITATION

## 2025-01-15 PROCEDURE — 97530 THERAPEUTIC ACTIVITIES: CPT

## 2025-01-15 PROCEDURE — 97116 GAIT TRAINING THERAPY: CPT

## 2025-01-15 PROCEDURE — 6370000000 HC RX 637 (ALT 250 FOR IP): Performed by: PHYSICAL MEDICINE & REHABILITATION

## 2025-01-15 PROCEDURE — 97112 NEUROMUSCULAR REEDUCATION: CPT

## 2025-01-15 PROCEDURE — 99232 SBSQ HOSP IP/OBS MODERATE 35: CPT | Performed by: PHYSICAL MEDICINE & REHABILITATION

## 2025-01-15 PROCEDURE — 6370000000 HC RX 637 (ALT 250 FOR IP): Performed by: FAMILY MEDICINE

## 2025-01-15 PROCEDURE — 1180000000 HC REHAB R&B

## 2025-01-15 PROCEDURE — 97140 MANUAL THERAPY 1/> REGIONS: CPT

## 2025-01-15 PROCEDURE — 97535 SELF CARE MNGMENT TRAINING: CPT

## 2025-01-15 RX ADMIN — ACETAMINOPHEN 650 MG: 325 TABLET ORAL at 20:01

## 2025-01-15 RX ADMIN — DOCUSATE SODIUM 100 MG: 100 CAPSULE, LIQUID FILLED ORAL at 09:04

## 2025-01-15 RX ADMIN — ACETAMINOPHEN 650 MG: 325 TABLET ORAL at 15:05

## 2025-01-15 RX ADMIN — SERTRALINE HYDROCHLORIDE 50 MG: 50 TABLET ORAL at 09:04

## 2025-01-15 RX ADMIN — Medication 12.5 MG: at 06:10

## 2025-01-15 RX ADMIN — ENOXAPARIN SODIUM 40 MG: 100 INJECTION SUBCUTANEOUS at 09:09

## 2025-01-15 RX ADMIN — GABAPENTIN 300 MG: 300 CAPSULE ORAL at 20:01

## 2025-01-15 RX ADMIN — CALCIUM 500 MG: 500 TABLET ORAL at 17:52

## 2025-01-15 RX ADMIN — Medication 5000 UNITS: at 09:03

## 2025-01-15 RX ADMIN — METHOCARBAMOL 500 MG: 500 TABLET ORAL at 20:01

## 2025-01-15 RX ADMIN — CALCIUM 500 MG: 500 TABLET ORAL at 09:04

## 2025-01-15 RX ADMIN — Medication 1 TABLET: at 09:04

## 2025-01-15 RX ADMIN — GABAPENTIN 300 MG: 300 CAPSULE ORAL at 06:10

## 2025-01-15 RX ADMIN — METHOCARBAMOL 500 MG: 500 TABLET ORAL at 09:04

## 2025-01-15 RX ADMIN — ACETAMINOPHEN 650 MG: 325 TABLET ORAL at 09:04

## 2025-01-15 RX ADMIN — METHOCARBAMOL 500 MG: 500 TABLET ORAL at 15:06

## 2025-01-15 RX ADMIN — GABAPENTIN 300 MG: 300 CAPSULE ORAL at 15:06

## 2025-01-15 ASSESSMENT — ENCOUNTER SYMPTOMS
CONSTIPATION: 0
RHINORRHEA: 0
BACK PAIN: 0
ABDOMINAL PAIN: 0
TROUBLE SWALLOWING: 0
DIARRHEA: 0
WHEEZING: 0
NAUSEA: 0
SHORTNESS OF BREATH: 0
SORE THROAT: 0
VOMITING: 0
COUGH: 0

## 2025-01-15 ASSESSMENT — PAIN SCALES - GENERAL: PAINLEVEL_OUTOF10: 0
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
## 2025-01-15 NOTE — CARE COORDINATION
States his goal is to work with therapy to be able to go home.  Talked about his goal to not  vaping after discharge.  Ed on safety when goes home by wearing shoes or nonskid socks, walker in place and light on. Denies having pain, smiling and interacting well with staff.

## 2025-01-15 NOTE — PLAN OF CARE
Problem: Discharge Planning  Goal: Discharge to home or other facility with appropriate resources  1/15/2025 0325 by Ava Elliott RN  Outcome: Progressing     Problem: Safety - Adult  Goal: Free from fall injury  Outcome: Progressing     Problem: Pain  Goal: Verbalizes/displays adequate comfort level or baseline comfort level  Recent Flowsheet Documentation  Taken 1/14/2025 2000 by Ava Elliott RN  Verbalizes/displays adequate comfort level or baseline comfort level:   Encourage patient to monitor pain and request assistance   Assess pain using appropriate pain scale   Administer analgesics based on type and severity of pain and evaluate response   Implement non-pharmacological measures as appropriate and evaluate response     Problem: Pain  Goal: Verbalizes/displays adequate comfort level or baseline comfort level  Outcome: Progressing  Flowsheets (Taken 1/14/2025 2000)  Verbalizes/displays adequate comfort level or baseline comfort level:   Encourage patient to monitor pain and request assistance   Assess pain using appropriate pain scale   Administer analgesics based on type and severity of pain and evaluate response   Implement non-pharmacological measures as appropriate and evaluate response     Problem: Skin/Tissue Integrity - Adult  Goal: Incisions, wounds, or drain sites healing without S/S of infection  Outcome: Progressing  Flowsheets (Taken 1/14/2025 2003)  Incisions, Wounds, or Drain Sites Healing Without Sign and Symptoms of Infection: TWICE DAILY: Assess and document skin integrity     Problem: Musculoskeletal - Adult  Goal: Return mobility to safest level of function  Outcome: Progressing  Flowsheets (Taken 1/14/2025 2003)  Return Mobility to Safest Level of Function: Assess patient stability and activity tolerance for standing, transferring and ambulating with or without assistive devices     Problem: Musculoskeletal - Adult  Goal: Maintain proper alignment of affected body part  Outcome:

## 2025-01-15 NOTE — PLAN OF CARE
Problem: Discharge Planning  Goal: Discharge to home or other facility with appropriate resources  1/15/2025 1317 by Uzma Lazaro LISW-S  Note: SW updated patient's insurance on this date per patient's request. SW received an intent to deny for continued stay. Patient's last cover day is 1/16. SW met with patient to discuss. Patient reports understanding and is in agreement for a discharge of 1/17. Patient reports feeling ready to discharge to home. Patient would like a referral faxed to [facility] due to previously using them. SW educated patient on family training. Patient is not interested at this time. No outstanding needs.    SW faxed referral to [facility].    SW will follow and maintain involvement in discharge planning.

## 2025-01-15 NOTE — DISCHARGE INSTR - DIET
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
## 2025-01-15 NOTE — CARE COORDINATION
PT participated in therapy today. Had televisit with surgeon who stated he was able to have cast removed and a splint in its place. No issues noted.

## 2025-01-15 NOTE — PROGRESS NOTES
2: Pt. to retrieve/transport at least 3 items with SBA to maximize performance with self care prep.  GOAL MET, CONTINUE  Short Term Goal 3: Pt. to complete LB dressing with Min A with use of AE prn to enhance performance with ADLs. GOAL MET AND DISCONTINUED  Short Term Goal 4: Pt. to open/close at least 4/5 varying conatiners to maximize performance with daily occupations. GOAL NOT MET. CONTINUE.        Time Frame for Long Term Goals : 3 weeks from OT eval on the IPR unit  Long Term Goal 1: Pt. to demo improved occupational performance as evidenced by a score of 75/100 on the Modified Barthel Index. GOAL MET AND DISCONTINUED  Long Term Goal 2: Pt. to complete simple IADL routines with Supervision with modifications/adpatations as needed to progress to PLOF.  GOAL MET, CONTINUE  Long Term Goal 3: Pt. to demo Supervision during community re-entry task completion to improve performance with IADLs.  GOAL MET, CONTINUE      Revised Short-Term Goals  Short Term Goals  Time Frame for Short Term Goals: 1 week  Short Term Goal 1: GOAL MET  Short Term Goal 2: Pt. to retrieve/transport at least 3 items with SBA to maximize performance with self care prep.  Short Term Goal 3: GOAL MET  Short Term Goal 4: Pt. to open/close at least 4/5 varying conatiners to maximize performance with daily occupations.  Long Term Goals  Time Frame for Long Term Goals : 3 weeks from OT eval on the IPR unit  Long Term Goal 1: GOAL MET  Long Term Goal 2: Pt. to complete simple IADL routines with Supervision with modifications/adpatations as needed to progress to PLOF.  Long Term Goal 3: Pt. to demo Supervision during community re-entry task completion to improve performance with IADLs.          Following session, patient left in safe position with all fall risk precautions in place.

## 2025-01-15 NOTE — PLAN OF CARE
Problem: Safety - Adult  Goal: Free from fall injury  1/15/2025 1123 by Raegan Justice RN  Outcome: Progressing  Flowsheets (Taken 1/12/2025 1327 by Meena Sinclair RN)  Free From Fall Injury: Instruct family/caregiver on patient safety  1/15/2025 0325 by Ava Elliott RN  Outcome: Progressing     Problem: Pain  Goal: Verbalizes/displays adequate comfort level or baseline comfort level  1/15/2025 1123 by Raegan Justice RN  Outcome: Progressing  1/15/2025 0325 by Ava Elliott RN  Outcome: Progressing  Flowsheets (Taken 1/14/2025 2000)  Verbalizes/displays adequate comfort level or baseline comfort level:   Encourage patient to monitor pain and request assistance   Assess pain using appropriate pain scale   Administer analgesics based on type and severity of pain and evaluate response   Implement non-pharmacological measures as appropriate and evaluate response     Problem: Skin/Tissue Integrity - Adult  Goal: Incisions, wounds, or drain sites healing without S/S of infection  1/15/2025 1123 by Raegan Justice RN  Outcome: Progressing  Flowsheets (Taken 1/15/2025 1051)  Incisions, Wounds, or Drain Sites Healing Without Sign and Symptoms of Infection:   ADMISSION and DAILY: Assess and document risk factors for pressure ulcer development   TWICE DAILY: Assess and document skin integrity   TWICE DAILY: Assess and document dressing/incision, wound bed, drain sites and surrounding tissue   Implement wound care per orders  1/15/2025 0325 by Ava Elliott RN  Outcome: Progressing  Flowsheets (Taken 1/14/2025 2003)  Incisions, Wounds, or Drain Sites Healing Without Sign and Symptoms of Infection: TWICE DAILY: Assess and document skin integrity     Problem: Musculoskeletal - Adult  Goal: Return mobility to safest level of function  1/15/2025 1123 by Raegan Justice RN  Outcome: Progressing  1/15/2025 0325 by Ava Elliott RN  Outcome: Progressing  Flowsheets (Taken 1/14/2025 2003)  Return Mobility to

## 2025-01-16 PROCEDURE — 97110 THERAPEUTIC EXERCISES: CPT

## 2025-01-16 PROCEDURE — 6370000000 HC RX 637 (ALT 250 FOR IP): Performed by: PHYSICAL MEDICINE & REHABILITATION

## 2025-01-16 PROCEDURE — 6360000002 HC RX W HCPCS: Performed by: PHYSICAL MEDICINE & REHABILITATION

## 2025-01-16 PROCEDURE — 97530 THERAPEUTIC ACTIVITIES: CPT

## 2025-01-16 PROCEDURE — 99232 SBSQ HOSP IP/OBS MODERATE 35: CPT | Performed by: PHYSICAL MEDICINE & REHABILITATION

## 2025-01-16 PROCEDURE — 6370000000 HC RX 637 (ALT 250 FOR IP): Performed by: FAMILY MEDICINE

## 2025-01-16 PROCEDURE — 97116 GAIT TRAINING THERAPY: CPT

## 2025-01-16 PROCEDURE — 1180000000 HC REHAB R&B

## 2025-01-16 PROCEDURE — 97535 SELF CARE MNGMENT TRAINING: CPT

## 2025-01-16 RX ORDER — GINSENG 100 MG
CAPSULE ORAL
Qty: 14 G | Refills: 1 | Status: SHIPPED | OUTPATIENT
Start: 2025-01-16 | End: 2025-01-26

## 2025-01-16 RX ORDER — MULTIVITAMIN WITH IRON
1 TABLET ORAL DAILY
Qty: 30 TABLET | Refills: 3 | Status: SHIPPED | OUTPATIENT
Start: 2025-01-17

## 2025-01-16 RX ORDER — CALCIUM CARBONATE 500(1250)
500 TABLET ORAL 2 TIMES DAILY WITH MEALS
Qty: 60 TABLET | Refills: 3 | Status: SHIPPED | OUTPATIENT
Start: 2025-01-16

## 2025-01-16 RX ORDER — METHOCARBAMOL 500 MG/1
500 TABLET, FILM COATED ORAL 3 TIMES DAILY PRN
Qty: 90 TABLET | Refills: 2 | Status: SHIPPED | OUTPATIENT
Start: 2025-01-16

## 2025-01-16 RX ORDER — SENNOSIDES A AND B 8.6 MG/1
1 TABLET, FILM COATED ORAL NIGHTLY PRN
Qty: 30 TABLET | Refills: 2 | Status: SHIPPED | OUTPATIENT
Start: 2025-01-16

## 2025-01-16 RX ORDER — PSEUDOEPHEDRINE HCL 30 MG
100 TABLET ORAL DAILY
Qty: 30 CAPSULE | Refills: 3 | Status: SHIPPED | OUTPATIENT
Start: 2025-01-16

## 2025-01-16 RX ORDER — NICOTINE 21 MG/24HR
1 PATCH, TRANSDERMAL 24 HOURS TRANSDERMAL DAILY
Qty: 30 PATCH | Refills: 3 | Status: SHIPPED | OUTPATIENT
Start: 2025-01-17

## 2025-01-16 RX ORDER — VITAMIN B COMPLEX
2000 TABLET ORAL DAILY
Qty: 60 TABLET | Refills: 3 | Status: SHIPPED | OUTPATIENT
Start: 2025-01-17

## 2025-01-16 RX ORDER — GABAPENTIN 300 MG/1
300 CAPSULE ORAL EVERY 8 HOURS
Qty: 90 CAPSULE | Refills: 3 | Status: SHIPPED | OUTPATIENT
Start: 2025-01-16 | End: 2025-05-16

## 2025-01-16 RX ADMIN — GABAPENTIN 300 MG: 300 CAPSULE ORAL at 06:42

## 2025-01-16 RX ADMIN — Medication 1 TABLET: at 08:26

## 2025-01-16 RX ADMIN — ACETAMINOPHEN 650 MG: 325 TABLET ORAL at 08:26

## 2025-01-16 RX ADMIN — METHOCARBAMOL 500 MG: 500 TABLET ORAL at 14:59

## 2025-01-16 RX ADMIN — CALCIUM 500 MG: 500 TABLET ORAL at 16:22

## 2025-01-16 RX ADMIN — ENOXAPARIN SODIUM 40 MG: 100 INJECTION SUBCUTANEOUS at 08:27

## 2025-01-16 RX ADMIN — METHOCARBAMOL 500 MG: 500 TABLET ORAL at 20:23

## 2025-01-16 RX ADMIN — ACETAMINOPHEN 650 MG: 325 TABLET ORAL at 14:59

## 2025-01-16 RX ADMIN — Medication 6 MG: at 20:23

## 2025-01-16 RX ADMIN — GABAPENTIN 300 MG: 300 CAPSULE ORAL at 14:59

## 2025-01-16 RX ADMIN — Medication 5000 UNITS: at 08:26

## 2025-01-16 RX ADMIN — CALCIUM 500 MG: 500 TABLET ORAL at 08:27

## 2025-01-16 RX ADMIN — SERTRALINE HYDROCHLORIDE 50 MG: 50 TABLET ORAL at 08:27

## 2025-01-16 RX ADMIN — METHOCARBAMOL 500 MG: 500 TABLET ORAL at 08:25

## 2025-01-16 RX ADMIN — ACETAMINOPHEN 650 MG: 325 TABLET ORAL at 20:23

## 2025-01-16 RX ADMIN — GABAPENTIN 300 MG: 300 CAPSULE ORAL at 20:23

## 2025-01-16 ASSESSMENT — PAIN - FUNCTIONAL ASSESSMENT
PAIN_FUNCTIONAL_ASSESSMENT: ACTIVITIES ARE NOT PREVENTED

## 2025-01-16 ASSESSMENT — PAIN SCALES - GENERAL
PAINLEVEL_OUTOF10: 0

## 2025-01-16 NOTE — PROGRESS NOTES
Discontinuous steps  Sitting Down: Safe, smooth motion  Balance Score: 14/16 Initiation of Gait: No hesitancy  Step Height: R Swing Foot: Right foot complete clears floor  Step Length: R Swing Foot: Passes left stance foot  Step Height: L Swing Foot: Left foot complete clears floor  Step Length: L Swing Foot: Passes right stance foot  Step Symmetry: Right and left step appear equal  Step Continuity: Steps appear continuous  Path: Mild/moderate deviation or uses walking aid  Trunk: Marked sway or uses walking aid  Walking Time: Heels almost touching while walking  Gait Score: 9/12     Current Score: 23 / 28 (Date: 2025)    Interpretation of Score: Tinetti is  into a gait score and balance score. The higher the patient's score, the more independent/lower fall risk. A total score of 24 or more indicates low fall risk, 19-23 is moderate fall risk, and 18 or less is indicative of high fall risk.   Timed Up and Go (TUG)  Current Score: 10.72 Seconds (Date: 2025)    Interpretation of Score: This tests the patient’s mobility and fall risk. Less than 10 seconds = freely mobile; <20 seconds = mostly independent; 20-29 seconds = variable mobility; > 20 seconds = impaired mobility; > 30 seconds = very high fall risk. Additional scorin.1 seconds in vestibular patients = increased fall risk; > 13.5 seconds in elderly = Increased fall risk)  Modified Raul:  Current Functional Status:  Not Applicable    ASSESSMENT:  Assessment: Patient progressing toward established goals.  Activity Tolerance:  Patient tolerance of  treatment:Good.  Plan: Current Treatment Recommendations: Strengthening, Balance training, Functional mobility training, Transfer training, Gait training, Neuromuscular re-education, Stair training, Home exercise program, Wheelchair mobility training, Safety education & training, Patient/Caregiver education & training, Therapeutic activities, Equipment evaluation, education, & procurement,  Endurance training  General Plan:  (5x/wk 90 min)    Education:  Learners: Patient  Patient Education: Plan of Care, Home Exercise Program, Precautions/Restrictions, Bed Mobility, Transfers, Reviewed Prior Education, Gait, Stairs, Car Transfers, Health Promotion and Wellness Education, Home Safety Education,  - Patient Verbalized Understanding    Goals:  Patient Goals : \"To get back on my feet and go home\"  Short Term Goals  Time Frame for Short Term Goals: 1 week  Short Term Goal 1: Patient to perform bed mobility supine<>sit with Mod I while maintaining precuations in order to assist with getting into and out of bed. GOAL MET  Short Term Goal 2: Patient to perform sit<>stand transfers with use of RW and platform attachments with CGA from various surface heights in order to assist with safety with transfers in home. GOAL MET, SEE LTG  Short Term Goal 3: Patient to ambulate >/=50 feet with use of RW and platform attachments with SBA in order to assist with home mobility. GOAL MET, SEE LTG  Short Term Goal 4: Patient to perform car transfer with SBA in order to assist wtih getting to and from appointments. GOAL MET, SEE LTG  Short Term Goal 5: Patient to score >/=15/28 on the Tinetti in order to reduce risk for falls.  Long Term Goals  Time Frame for Long Term Goals : 3 weeks from IPR evaluation  Long Term Goal 1: Patient to perform sit<>stand transfers wtih use of RW and Mod I in order to assist with safety with transfers in home. GOAL MET, CONTINUE  Long Term Goal 2: Patient to ambulate >/=150 with LRAD and Mod I in order to assist with safety with home and community mobility. GOAL MET, CONTINUE  Long Term Goal 3: Patient to perform car transfer with Mod I in order to assist with getting to and from appointments. GOAL MET, CONTINUE  Long Term Goal 4: Patient to ascend/descend curb step with Mod I in order to assist wtih community access. GOAL MET, CONTINUE  Long Term Goal 5: Patient to perform TUG in </=20 seconds

## 2025-01-16 NOTE — PLAN OF CARE
Problem: Safety - Adult  Goal: Free from fall injury  Recent Flowsheet Documentation  Taken 1/15/2025 2224 by Nancy Clark RN  Free From Fall Injury: Instruct family/caregiver on patient safety  Problem: Pain  Goal: Verbalizes/displays adequate comfort level or baseline comfort level  1/15/2025 2224 by Nancy Clark RN  Outcome: Progressing  Flowsheets (Taken 1/15/2025 2224)  Verbalizes/displays adequate comfort level or baseline comfort level:   Encourage patient to monitor pain and request assistance   Assess pain using appropriate pain scale        Problem: Pain  Goal: Verbalizes/displays adequate comfort level or baseline comfort level  Recent Flowsheet Documentation  Taken 1/15/2025 2224 by Nancy Clark RN  Verbalizes/displays adequate comfort level or baseline comfort level:   Encourage patient to monitor pain and request assistance   Assess pain using appropriate pain scale

## 2025-01-16 NOTE — PLAN OF CARE
Problem: Discharge Planning  Goal: Discharge to home or other facility with appropriate resources  1/16/2025 1528 by Uzma Lazaro LISW-S  Note: SW spoke with Barbra at [facility]. She reported receiving orders for outpatient PT and OT. They have been in contact with patient to schedule.    SW met with patient to go over discharge planning. Patient reported speaking with [facility]. Patient had no outstanding needs.    SW will follow and maintain involvement in discharge planning.    Transportation:   Has transportation kept you from medical appointments, meetings, work, or from getting things needed for daily living? (Check all that apply)  No.      Health Literacy:   How often do you need to have someone help you when you read instructions, pamphlets, or other written material from your doctor or pharmacy?  0. - Never    Social Isolation:  How often do you feel lonely or isolated from those around you?  0. Never      Patient Mood Interview (PHQ-2 to 9) (from [employer]©)   Say to Patient: \"Over the last 2 weeks, have you been bothered by any of the following problems?\"   If symptom is present, enter 1 (yes) in column 1 (Symptom Presence)  If yes in column 1, then ask the patient: “About how often have you been bothered by this?”  Read and show the patient a card with the symptom frequency choices.    Indicate response in column 2, Symptom Frequency.   Symptom Presence  No (enter 0 in column 2)   Yes (enter 0-3 in column 2)  9.    No response (leave column 2 blank) Symptom Frequency  Never or 1 day  2-6 days (several days)  7-11 days (half or more of the days)  12-14 days (nearly every day    Symptom Presence Symptom Frequency   Little interest or pleasure in doing things 0. No 0. Never or 1 day   Feeling down, depressed, or hopeless 0. No 0. Never or 1 day

## 2025-01-16 NOTE — CARE COORDINATION
Right wrist cast removed. Patient tolerated well. Right wrist cleansed with OT. Right wrist splint applied. Denied questions.
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
## 2025-01-16 NOTE — PLAN OF CARE
Problem: Discharge Planning  Goal: Discharge to home or other facility with appropriate resources  1/16/2025 1030 by Uzma Lazaro LISW-S  Note: Team conference held Thursday, 1/16. Recommendations of the team were explained to the patient by Dr Tolliver and SW. Team is recommending that patient continue on acute inpatient rehab for PT and OT, with expected discharge date of Friday, 1/17. Following discharge, team is recommending outpatient PT and OT. Care plan reviewed with patient. Patient verbalized understanding of the plan of care and contributed to goal setting. SW to follow and maintain involvement in discharge planning.

## 2025-01-16 NOTE — CARE COORDINATION
Talked to Pt's surgeon's nurse Jamie in regards to weight bearing status for right arm and whether or not the knee brace can be unlocked. Jamie stated that Dr. Grace is in surgery for the next several hours and will have to call us back tomorrow as to the knee brace being unlocked. Jamie then called back to say he just spoke to Dr. Grace and PT is to continue to be Non weightbearing to the right arm/hand , however his knee brace can be unlocked and can be weaned from it over the next few weeks. PT is able to continue to use platform walker
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
## 2025-01-16 NOTE — PLAN OF CARE
Problem: Safety - Adult  Goal: Free from fall injury  1/16/2025 1011 by Raegan Justice RN  Outcome: Progressing  1/15/2025 2224 by Nancy Clark RN  Outcome: Progressing  Flowsheets (Taken 1/15/2025 2224)  Free From Fall Injury: Instruct family/caregiver on patient safety     Problem: Pain  Goal: Verbalizes/displays adequate comfort level or baseline comfort level  1/16/2025 1011 by Raegan Justice RN  Outcome: Progressing  1/15/2025 2224 by Nancy Clark RN  Outcome: Progressing  Flowsheets (Taken 1/15/2025 2224)  Verbalizes/displays adequate comfort level or baseline comfort level:   Encourage patient to monitor pain and request assistance   Assess pain using appropriate pain scale     Problem: Skin/Tissue Integrity - Adult  Goal: Incisions, wounds, or drain sites healing without S/S of infection  1/16/2025 1011 by Raegan Justice RN  Outcome: Progressing  Flowsheets (Taken 1/15/2025 2225 by Nancy Clark RN)  Incisions, Wounds, or Drain Sites Healing Without Sign and Symptoms of Infection: TWICE DAILY: Assess and document skin integrity  1/15/2025 2224 by Nancy Clark RN  Outcome: Progressing  Flowsheets (Taken 1/15/2025 2224)  Incisions, Wounds, or Drain Sites Healing Without Sign and Symptoms of Infection: TWICE DAILY: Assess and document skin integrity     Problem: Musculoskeletal - Adult  Goal: Return mobility to safest level of function  Outcome: Progressing  Goal: Maintain proper alignment of affected body part  Outcome: Progressing  Goal: Return ADL status to a safe level of function  Outcome: Progressing     Problem: Discharge Planning  Goal: Discharge to home or other facility with appropriate resources  Outcome: Progressing     Problem: Nutrition Deficit:  Goal: Optimize nutritional status  Outcome: Progressing

## 2025-01-17 VITALS
BODY MASS INDEX: 21.36 KG/M2 | HEIGHT: 69 IN | DIASTOLIC BLOOD PRESSURE: 90 MMHG | TEMPERATURE: 98 F | OXYGEN SATURATION: 98 % | HEART RATE: 101 BPM | RESPIRATION RATE: 18 BRPM | WEIGHT: 144.18 LBS | SYSTOLIC BLOOD PRESSURE: 136 MMHG

## 2025-01-17 PROCEDURE — 97116 GAIT TRAINING THERAPY: CPT

## 2025-01-17 PROCEDURE — 6360000002 HC RX W HCPCS: Performed by: PHYSICAL MEDICINE & REHABILITATION

## 2025-01-17 PROCEDURE — 6370000000 HC RX 637 (ALT 250 FOR IP): Performed by: PHYSICAL MEDICINE & REHABILITATION

## 2025-01-17 PROCEDURE — 97140 MANUAL THERAPY 1/> REGIONS: CPT

## 2025-01-17 PROCEDURE — 97530 THERAPEUTIC ACTIVITIES: CPT

## 2025-01-17 PROCEDURE — 99239 HOSP IP/OBS DSCHRG MGMT >30: CPT | Performed by: PHYSICAL MEDICINE & REHABILITATION

## 2025-01-17 PROCEDURE — 6370000000 HC RX 637 (ALT 250 FOR IP): Performed by: FAMILY MEDICINE

## 2025-01-17 RX ADMIN — Medication 1 TABLET: at 08:30

## 2025-01-17 RX ADMIN — GABAPENTIN 300 MG: 300 CAPSULE ORAL at 05:44

## 2025-01-17 RX ADMIN — ENOXAPARIN SODIUM 40 MG: 100 INJECTION SUBCUTANEOUS at 08:30

## 2025-01-17 RX ADMIN — Medication 5000 UNITS: at 08:29

## 2025-01-17 RX ADMIN — CALCIUM 500 MG: 500 TABLET ORAL at 08:31

## 2025-01-17 RX ADMIN — DOCUSATE SODIUM 100 MG: 100 CAPSULE, LIQUID FILLED ORAL at 08:30

## 2025-01-17 RX ADMIN — METHOCARBAMOL 500 MG: 500 TABLET ORAL at 08:30

## 2025-01-17 RX ADMIN — ACETAMINOPHEN 650 MG: 325 TABLET ORAL at 08:31

## 2025-01-17 RX ADMIN — SERTRALINE HYDROCHLORIDE 50 MG: 50 TABLET ORAL at 08:30

## 2025-01-17 RX ADMIN — Medication 12.5 MG: at 05:44

## 2025-01-17 ASSESSMENT — PAIN - FUNCTIONAL ASSESSMENT: PAIN_FUNCTIONAL_ASSESSMENT: ACTIVITIES ARE NOT PREVENTED

## 2025-01-17 ASSESSMENT — PAIN SCALES - GENERAL: PAINLEVEL_OUTOF10: 0

## 2025-01-17 NOTE — PROGRESS NOTES
[facility]  Recreational Therapy  Discharge Note  Inpatient Rehabilitation Unit         Date:  1/17/2025       Patient Name: Derick Sanchez III      MRN: [id number]       YOB: 2000 (24 y.o.)       Gender: male     Referring Practitioner: Chaz Tolliver MD    Patient discharged from Recreational Therapy at this time.  See recreational therapy notes for details.    Electronically signed by: Fabiana Henao, CTRS  Date: 1/17/2025

## 2025-01-17 NOTE — PROGRESS NOTES
Patient discharged in stable condition as per order of attending physician to Home per staff at Time: 1620 and Via Wheelchair to car.    AVS provided by RN at time of discharge, which includes all necessary medical information pertaining to the patients current course of illness, treatment, medications, post-discharge goals of care, and treatment preferences.     Patient/ family verbalize understanding of discharge plan and are in agreement with goal/plan/treatment preferences.     Belongings including  clothing, cell phone,   sent with patient.      Home medications sent home with patient N/A    Availability of \"My Chart\" offered to patient as a tool for updated health record.  Steps for activation discussed with patient as mentioned on AVS.

## 2025-01-17 NOTE — PROGRESS NOTES
Spiritual Health History and Assessment/Progress Note  [facility]    (P) Spiritual/Emotional Needs,  ,  ,      Name: Derick Sanchez III MRN: [id number]    Age: 24 y.o.     Sex: male   Language: English   Congregation: None   Multiple fracture     Date: 1/17/2025            Total Time Calculated: (P) 6 min              Spiritual Assessment continued in [facility]        Referral/Consult From: (P) Rounding   Encounter Overview/Reason: (P) Spiritual/Emotional Needs  Service Provided For: (P) Patient    Stefany, Belief, Meaning:   Patient identifies as spiritual  Family/Friends No family/friends present      Importance and Influence:  Patient has spiritual/personal beliefs that influence decisions regarding their health  Family/Friends No family/friends present    Community:  Patient is connected with a spiritual community  Family/Friends feel well-supported. Support system includes: Extended family    Assessment and Plan of Care:     Patient Interventions include: Facilitated expression of thoughts and feelings  Family/Friends Interventions include: No family/friends present    Patient Plan of Care: No spiritual needs identified for follow-up  Family/Friends Plan of Care: No family/friends present    Electronically signed by JOSE Clarke on 1/17/2025 at 3:59 PM

## 2025-01-17 NOTE — PLAN OF CARE
Problem: Discharge Planning  Goal: Discharge to home or other facility with appropriate resources  1/16/2025 2259 by Fidencio Woodward, RN  Outcome: Progressing  Flowsheets (Taken 1/16/2025 2015)  Discharge to home or other facility with appropriate resources: Identify barriers to discharge with patient and caregiver     Problem: Safety - Adult  Goal: Free from fall injury  1/16/2025 2259 by Fidencio Woodward, RN  Outcome: Progressing     Problem: Pain  Goal: Verbalizes/displays adequate comfort level or baseline comfort level  1/16/2025 2259 by Fidencio Woodward, RN  Outcome: Progressing  Flowsheets (Taken 1/16/2025 2015)  Verbalizes/displays adequate comfort level or baseline comfort level: Encourage patient to monitor pain and request assistance     Problem: Skin/Tissue Integrity - Adult  Goal: Incisions, wounds, or drain sites healing without S/S of infection  1/16/2025 2259 by Fidencio Woodward, RN  Outcome: Progressing     Problem: Musculoskeletal - Adult  Goal: Return mobility to safest level of function  1/16/2025 2259 by Fidencio Woodward, RN  Outcome: Progressing  Flowsheets (Taken 1/16/2025 2015)  Return Mobility to Safest Level of Function: Assess patient stability and activity tolerance for standing, transferring and ambulating with or without assistive devices

## 2025-01-17 NOTE — CARE COORDINATION
Rested well during the shift. Pt is mod I in room using his platform walker. Continues to be a weight bearing as tolerated on BLE. No other concern noted. For discharge today. Electronically signed by Fidencio Woodward RN on 1/17/2025 at 1:48 AM
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
## 2025-01-17 NOTE — PLAN OF CARE
Problem: Discharge Planning  Goal: Discharge to home or other facility with appropriate resources  2025 0807 by Uzma Lazaro LISW-S  Note:   [facility]  Physical Medicine and Rehabilitation   Discharge Note    Date: 2025  Patient Name: Derick Sanchez III  MRN: [id number]  : 2000 (24 y.o.)    Patient to be discharged on Friday,  to home. Patient will be under the supervision of his family. Family training was not provided per patient's request. IPR team recommended outpatient PT and OT services at discharge. Patient will be receiving services through [facility]. RONALDO initiated referral on 1/15 to [facility] via fax. RONALDO spoke with Barbra on  to confirm referral. No outstanding needs or concerns regarding discharge plan. RONALDO provided contact information for future questions or concerns.

## 2025-01-17 NOTE — PLAN OF CARE
Problem: Safety - Adult  Goal: Free from fall injury  Outcome: Adequate for Discharge     Problem: Pain  Goal: Verbalizes/displays adequate comfort level or baseline comfort level  Outcome: Adequate for Discharge     Problem: Skin/Tissue Integrity - Adult  Goal: Incisions, wounds, or drain sites healing without S/S of infection  Outcome: Adequate for Discharge     Problem: Musculoskeletal - Adult  Goal: Return mobility to safest level of function  Outcome: Adequate for Discharge  Goal: Maintain proper alignment of affected body part  Outcome: Adequate for Discharge  Goal: Return ADL status to a safe level of function  Outcome: Adequate for Discharge     Problem: Discharge Planning  Goal: Discharge to home or other facility with appropriate resources  2025 1717 by Nadine Bowers LPN  Outcome: Adequate for Discharge  2025 0807 by Uzma Lazaro LISW-S  Note:   [facility]  Physical Medicine and Rehabilitation   Discharge Note    Date: 2025  Patient Name: Derick Sanchez III  MRN: [id number]  : 2000 (24 y.o.)    Patient to be discharged on Friday,  to home. Patient will be under the supervision of his family. Family training was not provided per patient's request. IPR team recommended outpatient PT and OT services at discharge. Patient will be receiving services through [facility]. RONALDO initiated referral on 1/15 to [facility] via fax. RONALDO spoke with Barbra on  to confirm referral. No outstanding needs or concerns regarding discharge plan. RONALDO provided contact information for future questions or concerns.      Problem: Nutrition Deficit:  Goal: Optimize nutritional status  Outcome: Adequate for Discharge

## 2025-01-17 NOTE — PROGRESS NOTES
assessed per clinical judgement, see nursing flowsheet    OBJECTIVE:  Bed Mobility:  Not Tested    Transfers:  Sit to Stand: Modified Independent  Stand to Sit:Modified Independent  Car:Modified Independent    Ambulation:  Modified Independent  Distance: ~300'x2   Surface: Level Tile  Device: Platform Walker Left and Platform Walker Right  Gait Deviations: Decreased Heel Strike Bilaterally and Mild Path Deviations     Stairs:  Stairs: 6\" steps X 12 using R forearm utilized on railing and Modified Independent. Reciprocal pattern trialed, tolerated well.     Balance:  Not Tested    Exercise:  R knee flexion on skateboard x ~20 reps, hold 10 seconds each to work on knee flexion ROM.     Functional Outcome Measures:  Not completed  Modified [place] Scale:  Not Applicable    ASSESSMENT:  Assessment: Patient progressing toward established goals. Patient continues to demonstrate deficits in Strength, Balance, Endurance, and Gait mechanics and would benefit from continued skilled PT to address these impairments and return to PLOF.   Activity Tolerance:  Patient tolerance of  treatment:Good.  Plan: Pt. to be discharged home with outpatient PT    Education:  Learners: Patient  Patient Education: Plan of Care, Precautions/Restrictions, Transfers, Reviewed Prior Education, Gait, Stairs, Car Transfers, Health Promotion and Wellness Education, Home Safety Education,  - Patient Verbalized Understanding    Goals:  Patient Goals : \"To get back on my feet and go home\"    Short Term Goals  Time Frame for Short Term Goals: 1 week  Short Term Goal 1: Patient to perform bed mobility supine<>sit with Mod I while maintaining precuations in order to assist with getting into and out of bed. GOAL MET   Short Term Goal 2: Patient to perform sit<>stand transfers with use of RW and platform attachments with CGA from various surface heights in order to assist with safety with transfers in home. GOAL MET, SEE LTG   Short Term Goal 3: Patient to

## (undated) DEVICE — STERILE HOOK LOCK LATEX FREE ELASTIC BANDAGE 3INX5YD: Brand: HOOK LOCK™

## (undated) DEVICE — CONVERTED USE 248063 TOWELS OR BL ST

## (undated) DEVICE — BASIN EMESIS 500CC ROSE 250/CS 60/PLT: Brand: MEDEGEN MEDICAL PRODUCTS, LLC

## (undated) DEVICE — MEDI-VAC NON-CONDUCTIVE SUCTION TUBING: Brand: CARDINAL HEALTH

## (undated) DEVICE — 9165 UNIVERSAL PATIENT PLATE: Brand: 3M™

## (undated) DEVICE — DISCONTINUED USE 397719 SPLINT WRIST FOREARM FM UNIV LT

## (undated) DEVICE — CONVERTED USE 338908 SPONGES LAP 18X18 ST

## (undated) DEVICE — GAUZE,SPONGE,FLUFF,6"X6.75",STRL,5/TRAY: Brand: MEDLINE

## (undated) DEVICE — SKIN AFFIX SURG ADHESIVE 72/CS 0.55ML: Brand: MEDLINE

## (undated) DEVICE — NO USE 18 MONTHS PACKS CUSTOM EXTREMITY DEFIANCE SURG

## (undated) DEVICE — 1200CC GUARDIAN II: Brand: GUARDIAN

## (undated) DEVICE — PENCIL ES L3M BTTN SWCH HOLSTER W/ BLDE ELECTRD EDGE

## (undated) DEVICE — SCRUB DRY SURG EZ SCRUB BRUSH PREOPERATIVE GRN

## (undated) DEVICE — GLOVE SURG SZ 85 L12IN FNGR THK13MIL WHT ISOLEX POLYISOPRENE

## (undated) DEVICE — SOLUTION IV IRRIG POUR BRL 0.9% SODIUM CHL 2F7124

## (undated) DEVICE — 1.8MM DRILL LONG WITH AO QUICK CONNECT: Brand: EVOS

## (undated) DEVICE — SUTURE ETHLN SZ 3-0 L18IN NONABSORBABLE BLK FS-1 L24MM 3/8 663H

## (undated) DEVICE — PACK SURG PROC REMINDER N WVN DISPOSABLE BEAC TIME OUT

## (undated) DEVICE — DRAPE,U/ SHT,SPLIT,PLAS,STERIL: Brand: MEDLINE

## (undated) DEVICE — SUTURE MCRYL SZ 2-0 L27IN ABSRB UD SH L26MM TAPERPOINT NDL Y417H

## (undated) DEVICE — COVER LT HNDL BLU PLAS

## (undated) DEVICE — CHLORAPREP 26ML ORANGE

## (undated) DEVICE — WEBRIL* CAST PADDING: Brand: DEROYAL